# Patient Record
Sex: FEMALE | Race: WHITE | Employment: OTHER | RURAL
[De-identification: names, ages, dates, MRNs, and addresses within clinical notes are randomized per-mention and may not be internally consistent; named-entity substitution may affect disease eponyms.]

---

## 2024-07-12 ENCOUNTER — LAB (OUTPATIENT)
Age: 74
End: 2024-07-12
Payer: MEDICARE

## 2024-07-12 ENCOUNTER — OFFICE VISIT (OUTPATIENT)
Age: 74
End: 2024-07-12
Payer: MEDICARE

## 2024-07-12 VITALS
DIASTOLIC BLOOD PRESSURE: 70 MMHG | HEIGHT: 62 IN | BODY MASS INDEX: 34.82 KG/M2 | TEMPERATURE: 97.7 F | WEIGHT: 189.2 LBS | SYSTOLIC BLOOD PRESSURE: 138 MMHG | OXYGEN SATURATION: 94 % | RESPIRATION RATE: 16 BRPM | HEART RATE: 67 BPM

## 2024-07-12 DIAGNOSIS — Z11.59 ENCOUNTER FOR HCV SCREENING TEST FOR LOW RISK PATIENT: ICD-10-CM

## 2024-07-12 DIAGNOSIS — E78.5 HYPERLIPIDEMIA, UNSPECIFIED HYPERLIPIDEMIA TYPE: ICD-10-CM

## 2024-07-12 DIAGNOSIS — Z12.11 SCREENING FOR MALIGNANT NEOPLASM OF COLON: ICD-10-CM

## 2024-07-12 DIAGNOSIS — R73.02 IMPAIRED GLUCOSE TOLERANCE: ICD-10-CM

## 2024-07-12 DIAGNOSIS — E03.9 ACQUIRED HYPOTHYROIDISM: ICD-10-CM

## 2024-07-12 DIAGNOSIS — M81.0 AGE-RELATED OSTEOPOROSIS WITHOUT CURRENT PATHOLOGICAL FRACTURE: ICD-10-CM

## 2024-07-12 DIAGNOSIS — I10 ESSENTIAL HYPERTENSION: Primary | ICD-10-CM

## 2024-07-12 DIAGNOSIS — Z53.20 MAMMOGRAM DECLINED: ICD-10-CM

## 2024-07-12 DIAGNOSIS — I10 ESSENTIAL HYPERTENSION: ICD-10-CM

## 2024-07-12 PROCEDURE — 1123F ACP DISCUSS/DSCN MKR DOCD: CPT | Performed by: FAMILY MEDICINE

## 2024-07-12 PROCEDURE — 3075F SYST BP GE 130 - 139MM HG: CPT | Performed by: FAMILY MEDICINE

## 2024-07-12 PROCEDURE — 99203 OFFICE O/P NEW LOW 30 MIN: CPT | Performed by: FAMILY MEDICINE

## 2024-07-12 PROCEDURE — 3078F DIAST BP <80 MM HG: CPT | Performed by: FAMILY MEDICINE

## 2024-07-12 RX ORDER — OXYCODONE HYDROCHLORIDE 5 MG/1
5 TABLET ORAL
COMMUNITY
Start: 2024-06-17

## 2024-07-12 RX ORDER — METOPROLOL SUCCINATE 100 MG/1
100 TABLET, EXTENDED RELEASE ORAL DAILY
Qty: 90 TABLET | Refills: 1 | Status: SHIPPED | OUTPATIENT
Start: 2024-07-12

## 2024-07-12 RX ORDER — LISINOPRIL 40 MG/1
40 TABLET ORAL
COMMUNITY
Start: 2024-06-06 | End: 2024-07-12 | Stop reason: SDUPTHER

## 2024-07-12 RX ORDER — ASPIRIN 81 MG/1
81 TABLET ORAL
COMMUNITY
Start: 2024-06-17

## 2024-07-12 RX ORDER — AMLODIPINE BESYLATE 10 MG/1
10 TABLET ORAL DAILY
Qty: 90 TABLET | Refills: 1 | Status: SHIPPED | OUTPATIENT
Start: 2024-07-12

## 2024-07-12 RX ORDER — ACETAMINOPHEN 500 MG
1000 TABLET ORAL
COMMUNITY
Start: 2024-06-06

## 2024-07-12 RX ORDER — LISINOPRIL 40 MG/1
40 TABLET ORAL DAILY
Qty: 90 TABLET | Refills: 1 | Status: SHIPPED | OUTPATIENT
Start: 2024-07-12

## 2024-07-12 RX ORDER — METOPROLOL SUCCINATE 100 MG/1
100 TABLET, EXTENDED RELEASE ORAL
COMMUNITY
Start: 2024-03-11 | End: 2024-07-12

## 2024-07-12 RX ORDER — LEVOTHYROXINE SODIUM 0.05 MG/1
TABLET ORAL
COMMUNITY
Start: 2024-01-14 | End: 2024-07-12 | Stop reason: SDUPTHER

## 2024-07-12 RX ORDER — LEVOTHYROXINE SODIUM 0.05 MG/1
50 TABLET ORAL DAILY
Qty: 90 TABLET | Refills: 0 | Status: SHIPPED | OUTPATIENT
Start: 2024-07-12

## 2024-07-12 RX ORDER — ATORVASTATIN CALCIUM 20 MG/1
20 TABLET, FILM COATED ORAL
COMMUNITY
Start: 2024-01-15 | End: 2024-07-12 | Stop reason: SDUPTHER

## 2024-07-12 RX ORDER — AMLODIPINE BESYLATE 10 MG/1
10 TABLET ORAL
COMMUNITY
Start: 2024-01-15 | End: 2024-07-12 | Stop reason: SDUPTHER

## 2024-07-12 RX ORDER — ATORVASTATIN CALCIUM 20 MG/1
20 TABLET, FILM COATED ORAL DAILY
Qty: 90 TABLET | Refills: 1 | Status: SHIPPED | OUTPATIENT
Start: 2024-07-12

## 2024-07-12 SDOH — ECONOMIC STABILITY: FOOD INSECURITY: WITHIN THE PAST 12 MONTHS, THE FOOD YOU BOUGHT JUST DIDN'T LAST AND YOU DIDN'T HAVE MONEY TO GET MORE.: NEVER TRUE

## 2024-07-12 SDOH — ECONOMIC STABILITY: INCOME INSECURITY: HOW HARD IS IT FOR YOU TO PAY FOR THE VERY BASICS LIKE FOOD, HOUSING, MEDICAL CARE, AND HEATING?: NOT HARD AT ALL

## 2024-07-12 SDOH — ECONOMIC STABILITY: FOOD INSECURITY: WITHIN THE PAST 12 MONTHS, YOU WORRIED THAT YOUR FOOD WOULD RUN OUT BEFORE YOU GOT MONEY TO BUY MORE.: NEVER TRUE

## 2024-07-12 SDOH — ECONOMIC STABILITY: HOUSING INSECURITY
IN THE LAST 12 MONTHS, WAS THERE A TIME WHEN YOU DID NOT HAVE A STEADY PLACE TO SLEEP OR SLEPT IN A SHELTER (INCLUDING NOW)?: NO

## 2024-07-12 ASSESSMENT — PATIENT HEALTH QUESTIONNAIRE - PHQ9
2. FEELING DOWN, DEPRESSED OR HOPELESS: NOT AT ALL
SUM OF ALL RESPONSES TO PHQ QUESTIONS 1-9: 0
1. LITTLE INTEREST OR PLEASURE IN DOING THINGS: NOT AT ALL
SUM OF ALL RESPONSES TO PHQ QUESTIONS 1-9: 0
SUM OF ALL RESPONSES TO PHQ9 QUESTIONS 1 & 2: 0

## 2024-07-12 ASSESSMENT — LIFESTYLE VARIABLES
HOW MANY STANDARD DRINKS CONTAINING ALCOHOL DO YOU HAVE ON A TYPICAL DAY: PATIENT DOES NOT DRINK
HOW OFTEN DO YOU HAVE A DRINK CONTAINING ALCOHOL: NEVER

## 2024-07-12 NOTE — PROGRESS NOTES
[place] [address] [place] [address]  Phone: [phone]  Fax: [phone]        Chief Complaint   Patient presents with    New Patient    Establish Care    Medicare AWV    Medication Refill     She is a 74 y.o. female who presents for establish care.  New to [facility].  Was living in [place], [place].    Presents for HTN follow up.  Taking medications as prescribed and reports no side effects.  Denies chest pain, headache, visual disturbances.  Says Bps at home have been up to 160s/90s, but have been better as of late.    Reports good compliance with thyroid medications. No issues/side effects.  Denies signs/symptoms of hyper/hypothyroidism.    Underwent right total hip on 6/17/24.  She is following with orthopedics and PT for this.    Reports that she never does mammograms.  Does not have any family history nor does she see the need for this.    Prior to Visit Medications    Medication Sig Taking? Authorizing Provider   acetaminophen (TYLENOL) 500 MG tablet Take 2 tablets by mouth Yes Sixto Zeng MD   ascorbic acid (VITAMIN C) 1000 MG tablet Take 1 tablet by mouth Yes Sixto Zeng MD   aspirin 81 MG EC tablet Take 1 tablet by mouth Yes Sixto Zeng MD   vitamin D3 (CHOLECALCIFEROL) 125 MCG (5000 UT) TABS tablet Take 1 tablet by mouth Yes Sixto Zeng MD   oxyCODONE (ROXICODONE) 5 MG immediate release tablet Take 1 tablet by mouth. Yes Sixto Zeng MD   amLODIPine (NORVASC) 10 MG tablet Take 1 tablet by mouth daily Yes Chepe Larose MD   atorvastatin (LIPITOR) 20 MG tablet Take 1 tablet by mouth daily Yes Chepe Larose MD   levothyroxine (SYNTHROID) 50 MCG tablet Take 1 tablet by mouth Daily Yes Chepe Larose MD   lisinopril (PRINIVIL;ZESTRIL) 40 MG tablet Take 1 tablet by mouth daily Yes Chepe Larose MD   metoprolol succinate (TOPROL XL) 100 MG extended release tablet Take 1 tablet by mouth daily Yes Chepe Larose MD       Allergies

## 2024-07-12 NOTE — PROGRESS NOTES
Identified pt with two pt identifiers(name and ).    Chief Complaint   Patient presents with    New Patient    Establish Care    Medicare AWV    Medication Refill        Health Maintenance Due   Topic    COVID-19 Vaccine (1)    Lipids     Depression Screen     Hepatitis C screen     DTaP/Tdap/Td vaccine (1 - Tdap)    Breast cancer screen     Colorectal Cancer Screen     Shingles vaccine (1 of 2)    DEXA (modify frequency per FRAX score)     Respiratory Syncytial Virus (RSV) Pregnant or age 60 yrs+ (1 - 1-dose 60+ series)    Pneumococcal 65+ years Vaccine (1 of 1 - PCV)    Annual Wellness Visit (Medicare)        Wt Readings from Last 3 Encounters:   24 85.8 kg (189 lb 3.2 oz)     Temp Readings from Last 3 Encounters:   24 97.7 °F (36.5 °C) (Temporal)     BP Readings from Last 3 Encounters:   24 138/70     Pulse Readings from Last 3 Encounters:   24 67           Depression Screening:  :         2024     1:11 PM   PHQ-9 Questionaire   Little interest or pleasure in doing things 0   Feeling down, depressed, or hopeless 0   PHQ-9 Total Score 0        Fall Risk Assessment:  :         2024     1:10 PM   Fall Risk   2 or more falls in past year? no   Fall with injury in past year? no        Abuse Screening:  :          No data to display                 Coordination of Care Questionnaire:  :     \"Have you been to the ER, urgent care clinic since your last visit?  Hospitalized since your last visit?\"    NO    “Have you seen or consulted any other health care providers outside of [facility] since your last visit?”    NO    Have you had a mammogram?”   NO  Pt states she doesn't do mammogram   No breast cancer screening on file         “Have you had a colorectal cancer screening such as a colonoscopy/FIT/Cologuard?    NO    No colonoscopy on file  No cologuard on file  No FIT/FOBT on file   No flexible sigmoidoscopy on file         Click Here for Release of Records Request

## 2024-07-13 LAB
ALBUMIN SERPL-MCNC: 4 G/DL (ref 3.5–5)
ALBUMIN/GLOB SERPL: 1.5 (ref 1.1–2.2)
ALP SERPL-CCNC: 162 U/L (ref 45–117)
ALT SERPL-CCNC: 22 U/L (ref 12–78)
ANION GAP SERPL CALC-SCNC: 4 MMOL/L (ref 5–15)
AST SERPL-CCNC: 13 U/L (ref 15–37)
BASOPHILS # BLD: 0 K/UL (ref 0–0.1)
BASOPHILS NFR BLD: 0 % (ref 0–1)
BILIRUB SERPL-MCNC: 0.4 MG/DL (ref 0.2–1)
BUN SERPL-MCNC: 15 MG/DL (ref 6–20)
BUN/CREAT SERPL: 14 (ref 12–20)
CALCIUM SERPL-MCNC: 9.3 MG/DL (ref 8.5–10.1)
CHLORIDE SERPL-SCNC: 103 MMOL/L (ref 97–108)
CHOLEST SERPL-MCNC: 151 MG/DL
CO2 SERPL-SCNC: 28 MMOL/L (ref 21–32)
CREAT SERPL-MCNC: 1.06 MG/DL (ref 0.55–1.02)
DIFFERENTIAL METHOD BLD: ABNORMAL
EOSINOPHIL # BLD: 0.1 K/UL (ref 0–0.4)
EOSINOPHIL NFR BLD: 1 % (ref 0–7)
ERYTHROCYTE [DISTWIDTH] IN BLOOD BY AUTOMATED COUNT: 13.1 % (ref 11.5–14.5)
EST. AVERAGE GLUCOSE BLD GHB EST-MCNC: 108 MG/DL
GLOBULIN SER CALC-MCNC: 2.7 G/DL (ref 2–4)
GLUCOSE SERPL-MCNC: 119 MG/DL (ref 65–100)
HBA1C MFR BLD: 5.4 % (ref 4–5.6)
HCT VFR BLD AUTO: 39.6 % (ref 35–47)
HCV AB SER IA-ACNC: 0.1 INDEX
HCV AB SERPL QL IA: NONREACTIVE
HDLC SERPL-MCNC: 59 MG/DL
HDLC SERPL: 2.6 (ref 0–5)
HGB BLD-MCNC: 12.3 G/DL (ref 11.5–16)
IMM GRANULOCYTES # BLD AUTO: 0 K/UL (ref 0–0.04)
IMM GRANULOCYTES NFR BLD AUTO: 0 % (ref 0–0.5)
LDLC SERPL CALC-MCNC: 54.2 MG/DL (ref 0–100)
LDLC SERPL DIRECT ASSAY-MCNC: 68 MG/DL (ref 0–100)
LYMPHOCYTES # BLD: 2.1 K/UL (ref 0.8–3.5)
LYMPHOCYTES NFR BLD: 30 % (ref 12–49)
MCH RBC QN AUTO: 31.5 PG (ref 26–34)
MCHC RBC AUTO-ENTMCNC: 31.1 G/DL (ref 30–36.5)
MCV RBC AUTO: 101.3 FL (ref 80–99)
MONOCYTES # BLD: 0.7 K/UL (ref 0–1)
MONOCYTES NFR BLD: 10 % (ref 5–13)
NEUTS SEG # BLD: 4.2 K/UL (ref 1.8–8)
NEUTS SEG NFR BLD: 59 % (ref 32–75)
NRBC # BLD: 0 K/UL (ref 0–0.01)
NRBC BLD-RTO: 0 PER 100 WBC
PLATELET # BLD AUTO: 411 K/UL (ref 150–400)
PMV BLD AUTO: 10.8 FL (ref 8.9–12.9)
POTASSIUM SERPL-SCNC: 4.5 MMOL/L (ref 3.5–5.1)
PROT SERPL-MCNC: 6.7 G/DL (ref 6.4–8.2)
RBC # BLD AUTO: 3.91 M/UL (ref 3.8–5.2)
SODIUM SERPL-SCNC: 135 MMOL/L (ref 136–145)
TRIGL SERPL-MCNC: 189 MG/DL
TSH SERPL DL<=0.05 MIU/L-ACNC: 1.06 UIU/ML (ref 0.36–3.74)
VLDLC SERPL CALC-MCNC: 37.8 MG/DL
WBC # BLD AUTO: 7.1 K/UL (ref 3.6–11)

## 2024-07-23 ENCOUNTER — HOSPITAL ENCOUNTER (OUTPATIENT)
Facility: HOSPITAL | Age: 74
Discharge: HOME | End: 2024-07-26
Attending: FAMILY MEDICINE
Payer: MEDICARE

## 2024-07-23 VITALS — BODY MASS INDEX: 34.78 KG/M2 | HEIGHT: 62 IN | WEIGHT: 189 LBS

## 2024-07-23 DIAGNOSIS — M81.0 AGE-RELATED OSTEOPOROSIS WITHOUT CURRENT PATHOLOGICAL FRACTURE: ICD-10-CM

## 2024-07-23 PROCEDURE — 77080 DXA BONE DENSITY AXIAL: CPT

## 2024-08-02 ENCOUNTER — TELEMEDICINE (OUTPATIENT)
Age: 74
End: 2024-08-02
Payer: MEDICARE

## 2024-08-02 DIAGNOSIS — M81.0 AGE-RELATED OSTEOPOROSIS WITHOUT CURRENT PATHOLOGICAL FRACTURE: Primary | ICD-10-CM

## 2024-08-02 PROCEDURE — 1123F ACP DISCUSS/DSCN MKR DOCD: CPT | Performed by: FAMILY MEDICINE

## 2024-08-02 PROCEDURE — 99213 OFFICE O/P EST LOW 20 MIN: CPT | Performed by: FAMILY MEDICINE

## 2024-08-02 RX ORDER — ALENDRONATE SODIUM 70 MG/1
70 TABLET ORAL
Qty: 13 TABLET | Refills: 1 | Status: SHIPPED | OUTPATIENT
Start: 2024-08-02

## 2024-08-02 NOTE — PROGRESS NOTES
[facility]  [address] [place] [address]  Phone: [phone]  Fax: [phone]      Brittney Alston, was evaluated through a synchronous (real-time) audio-video encounter. The patient (or guardian if applicable) is aware that this is a billable service, which includes applicable co-pays. This Virtual Visit was conducted with patient's (and/or legal guardian's) consent. Patient identification was verified, and a caregiver was present when appropriate.   The patient was located at Home: [address] [place] [id number]  Provider was located at Facility (Appt Dept): [facility] [place] [address]  Confirm you are appropriately licensed, registered, or certified to deliver care in the state where the patient is located as indicated above. If you are not or unsure, please re-schedule the visit: Yes, I confirm.       Chief Complaint   Patient presents with    Follow-up     Bone density      She is a 74 y.o. female who presents for DEXA follow up. Had DEXA on 7/23 which showed osteoporosis.   Never been told she has this before. No fractures. She has been taking her vitamin D supplement.     Underwent right total hip replacement in 6/2024. She is in therapy for this and reports that this is going well.    Reviewed PmHx, RxHx, FmHx, SocHx, AllgHx and updated and dated in the chart.      Objective:   There were no vitals filed for this visit.  Physical Examination:    Physical Exam  Nursing note reviewed.   Constitutional:       General: She is not in acute distress.     Appearance: Normal appearance.   HENT:      Head: Normocephalic.   Pulmonary:      Effort: Pulmonary effort is normal. No respiratory distress.   Musculoskeletal:      Cervical back: Normal range of motion.   Neurological:      Mental Status: She is alert and oriented to person, place, and time.   Psychiatric:         Mood and Affect: Mood normal.         Behavior: Behavior normal.

## 2024-08-02 NOTE — PROGRESS NOTES
Identified pt with two pt identifiers(name and ).    Chief Complaint   Patient presents with    Follow-up     Bone density         Health Maintenance Due   Topic    COVID-19 Vaccine (1)    DTaP/Tdap/Td vaccine (1 - Tdap)    Breast cancer screen     Colorectal Cancer Screen     Shingles vaccine (1 of 2)    Respiratory Syncytial Virus (RSV) Pregnant or age 60 yrs+ (1 - 1-dose 60+ series)    Pneumococcal 65+ years Vaccine (1 of 1 - PCV)    Annual Wellness Visit (Medicare Advantage)     Flu vaccine (1)       Wt Readings from Last 3 Encounters:   24 85.7 kg (189 lb)   24 85.8 kg (189 lb 3.2 oz)     Temp Readings from Last 3 Encounters:   24 97.7 °F (36.5 °C) (Temporal)     BP Readings from Last 3 Encounters:   24 138/70     Pulse Readings from Last 3 Encounters:   24 67           Depression Screening:  :         2024     1:25 PM 2024     1:11 PM   PHQ-9 Questionaire   Little interest or pleasure in doing things 0 0   Feeling down, depressed, or hopeless 0 0   PHQ-9 Total Score 0 0        Fall Risk Assessment:  :         2024     1:10 PM   Fall Risk   Do you feel unsteady or are you worried about falling?  yes   2 or more falls in past year? no   Fall with injury in past year? no        Abuse Screening:  :          No data to display                 Coordination of Care Questionnaire:  :     \"Have you been to the ER, urgent care clinic since your last visit?  Hospitalized since your last visit?\"    NO    “Have you seen or consulted any other health care providers outside of [facility] since your last visit?”    NO    Have you had a mammogram?”   NO    No breast cancer screening on file         “Have you had a colorectal cancer screening such as a colonoscopy/FIT/Cologuard?    NO    No colonoscopy on file  No cologuard on file  No FIT/FOBT on file   No flexible sigmoidoscopy on file         Click Here for Release of Records Request

## 2024-08-07 LAB — HEMOCCULT STL QL IA: NEGATIVE

## 2024-11-03 DIAGNOSIS — I10 ESSENTIAL HYPERTENSION: ICD-10-CM

## 2024-11-04 RX ORDER — LISINOPRIL 40 MG/1
40 TABLET ORAL DAILY
Qty: 90 TABLET | Refills: 1 | Status: SHIPPED | OUTPATIENT
Start: 2024-11-04

## 2024-11-05 ENCOUNTER — HOSPITAL ENCOUNTER (INPATIENT)
Facility: HOSPITAL | Age: 74
LOS: 4 days | Discharge: HOME | End: 2024-11-09
Attending: STUDENT IN AN ORGANIZED HEALTH CARE EDUCATION/TRAINING PROGRAM | Admitting: FAMILY MEDICINE
Payer: MEDICARE

## 2024-11-05 ENCOUNTER — APPOINTMENT (OUTPATIENT)
Facility: HOSPITAL | Age: 74
End: 2024-11-05
Payer: MEDICARE

## 2024-11-05 DIAGNOSIS — J96.02 ACUTE RESPIRATORY FAILURE WITH HYPOXIA AND HYPERCARBIA: Primary | ICD-10-CM

## 2024-11-05 DIAGNOSIS — E87.1 HYPONATREMIA: ICD-10-CM

## 2024-11-05 DIAGNOSIS — R94.31 ABNORMAL ELECTROCARDIOGRAPHY: ICD-10-CM

## 2024-11-05 DIAGNOSIS — J96.01 ACUTE RESPIRATORY FAILURE WITH HYPOXIA AND HYPERCARBIA: Primary | ICD-10-CM

## 2024-11-05 LAB
ALBUMIN SERPL-MCNC: 3.9 G/DL (ref 3.5–5)
ALBUMIN/GLOB SERPL: 0.9 (ref 1.1–2.2)
ALP SERPL-CCNC: 112 U/L (ref 45–117)
ALT SERPL-CCNC: ABNORMAL U/L (ref 12–78)
ANION GAP SERPL CALC-SCNC: 1 MMOL/L (ref 2–12)
ANION GAP SERPL CALC-SCNC: 3 MMOL/L (ref 2–12)
ARTERIAL PATENCY WRIST A: ABNORMAL
ARTERIAL PATENCY WRIST A: ABNORMAL
ARTERIAL PATENCY WRIST A: YES
AST SERPL-CCNC: ABNORMAL U/L (ref 15–37)
BASE EXCESS BLDA CALC-SCNC: 3.3 MMOL/L
BASE EXCESS BLDA CALC-SCNC: 4.6 MMOL/L
BASE EXCESS BLDA CALC-SCNC: 5.5 MMOL/L
BASOPHILS # BLD: 0 K/UL (ref 0–0.1)
BASOPHILS NFR BLD: 0 % (ref 0–1)
BDY SITE: ABNORMAL
BILIRUB SERPL-MCNC: 0.7 MG/DL (ref 0.2–1)
BUN SERPL-MCNC: 10 MG/DL (ref 6–20)
BUN SERPL-MCNC: 11 MG/DL (ref 6–20)
BUN/CREAT SERPL: 16 (ref 12–20)
BUN/CREAT SERPL: 16 (ref 12–20)
CALCIUM SERPL-MCNC: 8.7 MG/DL (ref 8.5–10.1)
CALCIUM SERPL-MCNC: 9.3 MG/DL (ref 8.5–10.1)
CHLORIDE SERPL-SCNC: 82 MMOL/L (ref 97–108)
CHLORIDE SERPL-SCNC: 85 MMOL/L (ref 97–108)
CO2 SERPL-SCNC: 36 MMOL/L (ref 21–32)
CO2 SERPL-SCNC: 36 MMOL/L (ref 21–32)
CREAT SERPL-MCNC: 0.61 MG/DL (ref 0.55–1.02)
CREAT SERPL-MCNC: 0.7 MG/DL (ref 0.55–1.02)
DIFFERENTIAL METHOD BLD: ABNORMAL
EOSINOPHIL # BLD: 0.1 K/UL (ref 0–0.4)
EOSINOPHIL NFR BLD: 1 % (ref 0–7)
ERYTHROCYTE [DISTWIDTH] IN BLOOD BY AUTOMATED COUNT: 13.2 % (ref 11.5–14.5)
FIO2 ON VENT: 40 %
FIO2 ON VENT: 40 %
FLUAV RNA SPEC QL NAA+PROBE: NOT DETECTED
FLUBV RNA SPEC QL NAA+PROBE: NOT DETECTED
GAS FLOW.O2 O2 DELIVERY SYS: 4 L/MIN
GAS FLOW.O2 SETTING OXYMISER: 26
GAS FLOW.O2 SETTING OXYMISER: 4
GLOBULIN SER CALC-MCNC: 4.4 G/DL (ref 2–4)
GLUCOSE SERPL-MCNC: 110 MG/DL (ref 65–100)
GLUCOSE SERPL-MCNC: 115 MG/DL (ref 65–100)
HCO3 BLDA-SCNC: 32 MMOL/L (ref 22–26)
HCO3 BLDA-SCNC: 36 MMOL/L (ref 22–26)
HCO3 BLDA-SCNC: 37 MMOL/L (ref 22–26)
HCT VFR BLD AUTO: 47.6 % (ref 35–47)
HGB BLD-MCNC: 15.6 G/DL (ref 11.5–16)
IMM GRANULOCYTES # BLD AUTO: 0.1 K/UL (ref 0–0.04)
IMM GRANULOCYTES NFR BLD AUTO: 1 % (ref 0–0.5)
INR PPP: 1 (ref 0.9–1.1)
IPAP/PIP: 14
IPAP/PIP: 28
LYMPHOCYTES # BLD: 2.4 K/UL (ref 0.8–3.5)
LYMPHOCYTES NFR BLD: 18 % (ref 12–49)
MAGNESIUM SERPL-MCNC: 2 MG/DL (ref 1.6–2.4)
MAGNESIUM SERPL-MCNC: NORMAL MG/DL (ref 1.6–2.4)
MCH RBC QN AUTO: 30.6 PG (ref 26–34)
MCHC RBC AUTO-ENTMCNC: 32.8 G/DL (ref 30–36.5)
MCV RBC AUTO: 93.5 FL (ref 80–99)
MONOCYTES # BLD: 1 K/UL (ref 0–1)
MONOCYTES NFR BLD: 7 % (ref 5–13)
NEUTS SEG # BLD: 9.9 K/UL (ref 1.8–8)
NEUTS SEG NFR BLD: 73 % (ref 32–75)
NRBC # BLD: 0 K/UL (ref 0–0.01)
NRBC BLD-RTO: 0 PER 100 WBC
NT PRO BNP: 494 PG/ML
PCO2 BLDA: 70 MMHG (ref 35–45)
PCO2 BLDA: 87 MMHG (ref 35–45)
PCO2 BLDA: 88 MMHG (ref 35–45)
PEEP RESPIRATORY: 6
PEEP RESPIRATORY: 6
PH BLDA: 7.23 (ref 7.35–7.45)
PH BLDA: 7.24 (ref 7.35–7.45)
PH BLDA: 7.28 (ref 7.35–7.45)
PLATELET # BLD AUTO: 327 K/UL (ref 150–400)
PMV BLD AUTO: 10.7 FL (ref 8.9–12.9)
PO2 BLDA: 76 MMHG (ref 80–100)
PO2 BLDA: 77 MMHG (ref 80–100)
PO2 BLDA: 99 MMHG (ref 80–100)
POTASSIUM SERPL-SCNC: 4.3 MMOL/L (ref 3.5–5.1)
POTASSIUM SERPL-SCNC: ABNORMAL MMOL/L (ref 3.5–5.1)
PROT SERPL-MCNC: 8.3 G/DL (ref 6.4–8.2)
PROTHROMBIN TIME: 10.7 SEC (ref 9–11.1)
RBC # BLD AUTO: 5.09 M/UL (ref 3.8–5.2)
SAO2 % BLD: 92 % (ref 92–97)
SAO2 % BLD: 92 % (ref 92–97)
SAO2 % BLD: 97 % (ref 92–97)
SAO2% DEVICE SAO2% SENSOR NAME: ABNORMAL
SARS-COV-2 RNA RESP QL NAA+PROBE: NOT DETECTED
SERVICE CMNT-IMP: ABNORMAL
SERVICE CMNT-IMP: ABNORMAL
SODIUM SERPL-SCNC: 119 MMOL/L (ref 136–145)
SODIUM SERPL-SCNC: 124 MMOL/L (ref 136–145)
SOURCE: NORMAL
SPECIMEN SITE: ABNORMAL
TROPONIN I SERPL HS-MCNC: 11 NG/L (ref 0–51)
TROPONIN I SERPL HS-MCNC: 9 NG/L (ref 0–51)
TSH SERPL DL<=0.05 MIU/L-ACNC: 1.82 UIU/ML (ref 0.36–3.74)
WBC # BLD AUTO: 13.5 K/UL (ref 3.6–11)

## 2024-11-05 PROCEDURE — 6360000004 HC RX CONTRAST MEDICATION: Performed by: STUDENT IN AN ORGANIZED HEALTH CARE EDUCATION/TRAINING PROGRAM

## 2024-11-05 PROCEDURE — 80053 COMPREHEN METABOLIC PANEL: CPT

## 2024-11-05 PROCEDURE — 71045 X-RAY EXAM CHEST 1 VIEW: CPT

## 2024-11-05 PROCEDURE — 83735 ASSAY OF MAGNESIUM: CPT

## 2024-11-05 PROCEDURE — 6360000002 HC RX W HCPCS: Performed by: STUDENT IN AN ORGANIZED HEALTH CARE EDUCATION/TRAINING PROGRAM

## 2024-11-05 PROCEDURE — 99291 CRITICAL CARE FIRST HOUR: CPT

## 2024-11-05 PROCEDURE — 85610 PROTHROMBIN TIME: CPT

## 2024-11-05 PROCEDURE — 71275 CT ANGIOGRAPHY CHEST: CPT

## 2024-11-05 PROCEDURE — 94761 N-INVAS EAR/PLS OXIMETRY MLT: CPT

## 2024-11-05 PROCEDURE — 94640 AIRWAY INHALATION TREATMENT: CPT

## 2024-11-05 PROCEDURE — 87636 SARSCOV2 & INF A&B AMP PRB: CPT

## 2024-11-05 PROCEDURE — 6360000002 HC RX W HCPCS

## 2024-11-05 PROCEDURE — 2580000003 HC RX 258: Performed by: STUDENT IN AN ORGANIZED HEALTH CARE EDUCATION/TRAINING PROGRAM

## 2024-11-05 PROCEDURE — 84443 ASSAY THYROID STIM HORMONE: CPT

## 2024-11-05 PROCEDURE — 82803 BLOOD GASES ANY COMBINATION: CPT

## 2024-11-05 PROCEDURE — 36415 COLL VENOUS BLD VENIPUNCTURE: CPT

## 2024-11-05 PROCEDURE — 96374 THER/PROPH/DIAG INJ IV PUSH: CPT

## 2024-11-05 PROCEDURE — 84484 ASSAY OF TROPONIN QUANT: CPT

## 2024-11-05 PROCEDURE — 94660 CPAP INITIATION&MGMT: CPT

## 2024-11-05 PROCEDURE — 1100000000 HC RM PRIVATE

## 2024-11-05 PROCEDURE — 93005 ELECTROCARDIOGRAM TRACING: CPT | Performed by: STUDENT IN AN ORGANIZED HEALTH CARE EDUCATION/TRAINING PROGRAM

## 2024-11-05 PROCEDURE — 85025 COMPLETE CBC W/AUTO DIFF WBC: CPT

## 2024-11-05 PROCEDURE — 2700000000 HC OXYGEN THERAPY PER DAY

## 2024-11-05 PROCEDURE — 5A09357 ASSISTANCE WITH RESPIRATORY VENTILATION, LESS THAN 24 CONSECUTIVE HOURS, CONTINUOUS POSITIVE AIRWAY PRESSURE: ICD-10-PCS | Performed by: FAMILY MEDICINE

## 2024-11-05 PROCEDURE — 83880 ASSAY OF NATRIURETIC PEPTIDE: CPT

## 2024-11-05 PROCEDURE — 6370000000 HC RX 637 (ALT 250 FOR IP): Performed by: STUDENT IN AN ORGANIZED HEALTH CARE EDUCATION/TRAINING PROGRAM

## 2024-11-05 PROCEDURE — 36600 WITHDRAWAL OF ARTERIAL BLOOD: CPT

## 2024-11-05 RX ORDER — POLYETHYLENE GLYCOL 3350 17 G/17G
17 POWDER, FOR SOLUTION ORAL DAILY PRN
Status: DISCONTINUED | OUTPATIENT
Start: 2024-11-05 | End: 2024-11-09 | Stop reason: HOSPADM

## 2024-11-05 RX ORDER — LISINOPRIL 20 MG/1
40 TABLET ORAL DAILY
Status: DISCONTINUED | OUTPATIENT
Start: 2024-11-06 | End: 2024-11-09 | Stop reason: HOSPADM

## 2024-11-05 RX ORDER — ACETAMINOPHEN 650 MG/1
650 SUPPOSITORY RECTAL EVERY 6 HOURS PRN
Status: DISCONTINUED | OUTPATIENT
Start: 2024-11-05 | End: 2024-11-09 | Stop reason: HOSPADM

## 2024-11-05 RX ORDER — LEVOTHYROXINE SODIUM 50 UG/1
50 TABLET ORAL DAILY
Status: DISCONTINUED | OUTPATIENT
Start: 2024-11-06 | End: 2024-11-09 | Stop reason: HOSPADM

## 2024-11-05 RX ORDER — ASPIRIN 81 MG/1
81 TABLET ORAL ONCE
Status: CANCELLED | OUTPATIENT
Start: 2024-11-05

## 2024-11-05 RX ORDER — IPRATROPIUM BROMIDE AND ALBUTEROL SULFATE 2.5; .5 MG/3ML; MG/3ML
1 SOLUTION RESPIRATORY (INHALATION)
Status: COMPLETED | OUTPATIENT
Start: 2024-11-05 | End: 2024-11-05

## 2024-11-05 RX ORDER — OXYCODONE HYDROCHLORIDE 5 MG/1
5 TABLET ORAL EVERY 6 HOURS PRN
Status: DISCONTINUED | OUTPATIENT
Start: 2024-11-05 | End: 2024-11-09 | Stop reason: HOSPADM

## 2024-11-05 RX ORDER — ATORVASTATIN CALCIUM 20 MG/1
20 TABLET, FILM COATED ORAL NIGHTLY
Status: DISCONTINUED | OUTPATIENT
Start: 2024-11-05 | End: 2024-11-09 | Stop reason: HOSPADM

## 2024-11-05 RX ORDER — SODIUM CHLORIDE 9 MG/ML
INJECTION, SOLUTION INTRAVENOUS PRN
Status: DISCONTINUED | OUTPATIENT
Start: 2024-11-05 | End: 2024-11-09 | Stop reason: HOSPADM

## 2024-11-05 RX ORDER — ENOXAPARIN SODIUM 100 MG/ML
40 INJECTION SUBCUTANEOUS DAILY
Status: DISCONTINUED | OUTPATIENT
Start: 2024-11-05 | End: 2024-11-09 | Stop reason: HOSPADM

## 2024-11-05 RX ORDER — BUDESONIDE 0.5 MG/2ML
0.5 INHALANT ORAL
Status: DISCONTINUED | OUTPATIENT
Start: 2024-11-05 | End: 2024-11-09 | Stop reason: HOSPADM

## 2024-11-05 RX ORDER — SODIUM CHLORIDE 0.9 % (FLUSH) 0.9 %
5-40 SYRINGE (ML) INJECTION PRN
Status: DISCONTINUED | OUTPATIENT
Start: 2024-11-05 | End: 2024-11-09 | Stop reason: HOSPADM

## 2024-11-05 RX ORDER — SODIUM CHLORIDE 0.9 % (FLUSH) 0.9 %
5-40 SYRINGE (ML) INJECTION EVERY 12 HOURS SCHEDULED
Status: DISCONTINUED | OUTPATIENT
Start: 2024-11-05 | End: 2024-11-09 | Stop reason: HOSPADM

## 2024-11-05 RX ORDER — ASCORBIC ACID 500 MG
1000 TABLET ORAL DAILY
Status: DISCONTINUED | OUTPATIENT
Start: 2024-11-06 | End: 2024-11-09 | Stop reason: HOSPADM

## 2024-11-05 RX ORDER — METOPROLOL SUCCINATE 50 MG/1
100 TABLET, EXTENDED RELEASE ORAL DAILY
Status: DISCONTINUED | OUTPATIENT
Start: 2024-11-06 | End: 2024-11-09 | Stop reason: HOSPADM

## 2024-11-05 RX ORDER — FUROSEMIDE 10 MG/ML
20 INJECTION INTRAMUSCULAR; INTRAVENOUS 2 TIMES DAILY
Status: DISCONTINUED | OUTPATIENT
Start: 2024-11-05 | End: 2024-11-08

## 2024-11-05 RX ORDER — IOPAMIDOL 755 MG/ML
100 INJECTION, SOLUTION INTRAVASCULAR
Status: COMPLETED | OUTPATIENT
Start: 2024-11-05 | End: 2024-11-05

## 2024-11-05 RX ORDER — ARFORMOTEROL TARTRATE 15 UG/2ML
15 SOLUTION RESPIRATORY (INHALATION)
Status: DISCONTINUED | OUTPATIENT
Start: 2024-11-05 | End: 2024-11-09 | Stop reason: HOSPADM

## 2024-11-05 RX ORDER — AMLODIPINE BESYLATE 5 MG/1
10 TABLET ORAL DAILY
Status: DISCONTINUED | OUTPATIENT
Start: 2024-11-06 | End: 2024-11-09 | Stop reason: HOSPADM

## 2024-11-05 RX ORDER — ONDANSETRON 2 MG/ML
4 INJECTION INTRAMUSCULAR; INTRAVENOUS EVERY 6 HOURS PRN
Status: DISCONTINUED | OUTPATIENT
Start: 2024-11-05 | End: 2024-11-09 | Stop reason: HOSPADM

## 2024-11-05 RX ORDER — IPRATROPIUM BROMIDE AND ALBUTEROL SULFATE 2.5; .5 MG/3ML; MG/3ML
1 SOLUTION RESPIRATORY (INHALATION)
Status: DISCONTINUED | OUTPATIENT
Start: 2024-11-06 | End: 2024-11-07

## 2024-11-05 RX ORDER — ACETAMINOPHEN 325 MG/1
650 TABLET ORAL EVERY 6 HOURS PRN
Status: DISCONTINUED | OUTPATIENT
Start: 2024-11-05 | End: 2024-11-09 | Stop reason: HOSPADM

## 2024-11-05 RX ORDER — ONDANSETRON 4 MG/1
4 TABLET, ORALLY DISINTEGRATING ORAL EVERY 8 HOURS PRN
Status: DISCONTINUED | OUTPATIENT
Start: 2024-11-05 | End: 2024-11-09 | Stop reason: HOSPADM

## 2024-11-05 RX ADMIN — IOPAMIDOL 100 ML: 755 INJECTION, SOLUTION INTRAVENOUS at 17:21

## 2024-11-05 RX ADMIN — IPRATROPIUM BROMIDE AND ALBUTEROL SULFATE 1 DOSE: 2.5; .5 SOLUTION RESPIRATORY (INHALATION) at 14:47

## 2024-11-05 RX ADMIN — FUROSEMIDE 20 MG: 10 INJECTION, SOLUTION INTRAMUSCULAR; INTRAVENOUS at 19:41

## 2024-11-05 RX ADMIN — METHYLPREDNISOLONE SODIUM SUCCINATE 125 MG: 125 INJECTION, POWDER, LYOPHILIZED, FOR SOLUTION INTRAMUSCULAR; INTRAVENOUS at 14:43

## 2024-11-05 RX ADMIN — ENOXAPARIN SODIUM 40 MG: 100 INJECTION SUBCUTANEOUS at 19:41

## 2024-11-05 RX ADMIN — IPRATROPIUM BROMIDE AND ALBUTEROL SULFATE 1 DOSE: 2.5; .5 SOLUTION RESPIRATORY (INHALATION) at 14:46

## 2024-11-05 ASSESSMENT — PAIN - FUNCTIONAL ASSESSMENT: PAIN_FUNCTIONAL_ASSESSMENT: 0-10

## 2024-11-05 ASSESSMENT — PAIN SCALES - GENERAL: PAINLEVEL_OUTOF10: 0

## 2024-11-05 NOTE — ED NOTES
1620--Came into room--Pt had pulled off BiPaP, Ecg leads, pulse ox--Pt placed on oxygen via NRB 15 lpm--100 pulse ox. Pt placed back on Ecg, pulse ox.     Pt is A&ox4, GCS of 15 at this itme.     1638--Pt placed back on BiPAP by Resp.     1712--Spoke with RT--cleared PT to go to CT with NC at 6lpm.     1730--Pt back from CT--placed back on BiPaP.

## 2024-11-05 NOTE — H&P
Admission H&P     Name: Brittney Alston MRN: [id number]  Sex: Female   YOB: 1950  Age: 74 y.o.  PCP: Chepe Larose MD     Source of Information: patient, medical records    Chief complaint: SOB    History of Present Illness  Brittney Alston is a 74 y.o. female with PMH HTN, HLD, hypothyroidism, osteoporosis  who presents to the ER with about one week of SOB and nausea. Has also had increasing leg swelling over 2 months. Denies CP, vomiting, changes to bowel habits, fever/chills. Denies h/o lung or heart disease.  also present and mentions she seemed confused over past week or so.      In the ER:      Vitals: afebrile, HR 71, /127 OA, O2 64% on RA    Labs: pH 7.24, pco2 88, po2 77, wbc 13.5, trop 11>9, , Na 119, covid and flu neg    Imaging: CXR low lung vol, mild peribronchial cuffing. CTA chest w/wo neg for PE, shows mosaic attenuation and scattered mucus plugging and bronchial wall thickening s/o small airway disease    EKG: LBBB (no priors), rate 72    Treatment: duoneb x3, IV solumedrol    Review of Systems  Review of Systems    Home Medications   Prior to Admission medications    Medication Sig Start Date End Date Taking? Authorizing Provider   lisinopril (PRINIVIL;ZESTRIL) 40 MG tablet TAKE ONE TABLET BY MOUTH ONE TIME DAILY 11/4/24   Chepe Larose MD   alendronate (FOSAMAX) 70 MG tablet Take 1 tablet by mouth every 7 days 8/2/24   Chepe Larose MD   acetaminophen (TYLENOL) 500 MG tablet Take 2 tablets by mouth 6/6/24   Sixto Zeng MD   ascorbic acid (VITAMIN C) 1000 MG tablet Take 1 tablet by mouth 6/6/24   Sixto Zeng MD   aspirin 81 MG EC tablet Take 1 tablet by mouth 6/17/24   Sixto Zeng MD   vitamin D3 (CHOLECALCIFEROL) 125 MCG (5000 UT) TABS tablet Take 1 tablet by mouth 6/6/24   Sixto Zeng MD   oxyCODONE (ROXICODONE) 5 MG immediate release tablet Take 1 tablet by mouth. 6/17/24   Sixto Zeng MD   amLODIPine (NORVASC) 10

## 2024-11-05 NOTE — ED TRIAGE NOTES
Pt arrives to the ER for complaints of nausea, shortness of breath and slurred speech. Pt reports that the slurred speech started today however, pt appears to short of breath with completing sentences.     Pt feels that she was unable to take a deep breath. Reports that she has improvement with the oxygen.     Pt room air in triage was 54% and placed on NRB.     MD Josh in triage to assess patient.     Pt at 1305 weaned off of NRB and placed on 4L NC. Pt sating at 96%.     Pt denies any chest pain.

## 2024-11-05 NOTE — ED NOTES
74-year-old female received in turnover pending CT scan.  She presented with hypoxia, altered mental status.  She was placed on BiPAP.  CT scan performed without evidence of central or large PE.  Will admit for further management.    Critical Care  Performed by: Alcides Chin MD  Authorized by: Alcides Chin MD     Critical care provider statement:     Critical care time (minutes): 42    Critical care time was exclusive of:  Separately billable procedures and treating other patients    Critical care was necessary to treat or prevent imminent or life-threatening deterioration of the following conditions: Acute hypoxic respiratory failure    Critical care was time spent personally by me on the following activities:  Evaluation of patient's response to treatment, examination of patient, ordering and performing treatments and interventions, ordering and review of laboratory studies and re-evaluation of patient's condition      Perfect Serve Consult for Admission  5:34 PM    ED Room Number: ER13/13  Patient Name and age:  Brittney Alston 74 y.o.  female  Working Diagnosis:   1. Acute respiratory failure with hypoxia and hypercarbia        COVID-19 Suspicion: No  Sepsis present:  No    Code Status:  Full Code  Readmission: No  Isolation Requirements: no  Recommended Level of Care: step down  Department: [facility] - [phone]  Consulting Provider: Dr. Kraus    Other: BiPAP for acute hypoxic and hypercapnic respiratory failure         Alcides Chin MD  11/05/24 6167

## 2024-11-05 NOTE — ED PROVIDER NOTES
[facility] EMERGENCY DEPT  EMERGENCY DEPARTMENT ENCOUNTER      Patient Name:  Brittney Alston  MRN:   [id number]  :   1950  Date of Evaluation: 2024  Physician:  Kera Moreno MD    Chief Complaint   Patient presents with    Shortness of Breath       HISTORY OF PRESENT ILLNESS    This is a 74-year-old female with a history of hypertension, hyperlipidemia, hypothyroidism, obesity, presenting with chest pain and shortness of breath that started about a week ago, she developed a sensation of pressure in her chest at rest and her symptoms have worsened until today when her  felt that her speech was slurred.  She is not dysarthric on exam but visibly dyspneic and in respiratory distress, hypoxic to 64% on room air.  She has new bilateral lower extremity edema and was noted to be hypertensive to the 200s systolic upon arrival.  Denies any history of ischemic heart disease to her knowledge or underlying lung disease.  No other systemic complaints.      REVIEW OF SYSTEMS   A review of systems was performed and was negative except as documented in the HPI.     PAST MEDICAL HISTORY     Past Medical History:   Diagnosis Date    Hyperlipidemia     Hypertension     Hypothyroidism     Obesity        PAST SURGICAL HISTORY     Past Surgical History:   Procedure Laterality Date    CHOLECYSTECTOMY      FOOT SURGERY Right     TOTAL HIP ARTHROPLASTY         ALLERGIES   Pholcodine, Codeine, and Seasonal    FAMILY HISTORY   No family history on file.    SOCIAL HISTORY     Social History     Socioeconomic History    Marital status:    Tobacco Use    Smoking status: Former     Current packs/day: 0.00     Types: Cigarettes     Quit date: 2004     Years since quittin.3    Smokeless tobacco: Never   Vaping Use    Vaping status: Never Used   Substance and Sexual Activity    Alcohol use: Not Currently    Drug use: Not Currently     Social Determinants of Health     Financial Resource Strain: Low Risk  (2024)

## 2024-11-06 ENCOUNTER — APPOINTMENT (OUTPATIENT)
Facility: HOSPITAL | Age: 74
End: 2024-11-06
Payer: MEDICARE

## 2024-11-06 ENCOUNTER — APPOINTMENT (OUTPATIENT)
Age: 74
End: 2024-11-06
Payer: MEDICARE

## 2024-11-06 PROBLEM — R06.00 DYSPNEA: Status: ACTIVE | Noted: 2024-11-06

## 2024-11-06 PROBLEM — I50.9 ACUTE CONGESTIVE HEART FAILURE (HCC): Status: ACTIVE | Noted: 2024-11-06

## 2024-11-06 LAB
ALBUMIN SERPL-MCNC: 3.4 G/DL (ref 3.5–5)
ALBUMIN/GLOB SERPL: 1 (ref 1.1–2.2)
ALP SERPL-CCNC: 87 U/L (ref 45–117)
ALT SERPL-CCNC: 26 U/L (ref 12–78)
AMMONIA PLAS-SCNC: 16 UMOL/L
ANION GAP SERPL CALC-SCNC: 2 MMOL/L (ref 2–12)
ANION GAP SERPL CALC-SCNC: 4 MMOL/L (ref 2–12)
ANION GAP SERPL CALC-SCNC: 4 MMOL/L (ref 2–12)
ANION GAP SERPL CALC-SCNC: 6 MMOL/L (ref 2–12)
ARTERIAL PATENCY WRIST A: ABNORMAL
AST SERPL-CCNC: ABNORMAL U/L (ref 15–37)
B PERT DNA SPEC QL NAA+PROBE: NOT DETECTED
BASE EXCESS BLD CALC-SCNC: 7.9 MMOL/L
BASOPHILS # BLD: 0 K/UL (ref 0–0.1)
BASOPHILS NFR BLD: 0 % (ref 0–1)
BDY SITE: ABNORMAL
BILIRUB SERPL-MCNC: 0.6 MG/DL (ref 0.2–1)
BORDETELLA PARAPERTUSSIS BY PCR: NOT DETECTED
BUN SERPL-MCNC: 14 MG/DL (ref 6–20)
BUN SERPL-MCNC: 15 MG/DL (ref 6–20)
BUN SERPL-MCNC: 16 MG/DL (ref 6–20)
BUN SERPL-MCNC: 22 MG/DL (ref 6–20)
BUN/CREAT SERPL: 14 (ref 12–20)
BUN/CREAT SERPL: 15 (ref 12–20)
BUN/CREAT SERPL: 19 (ref 12–20)
BUN/CREAT SERPL: 21 (ref 12–20)
C PNEUM DNA SPEC QL NAA+PROBE: NOT DETECTED
CALCIUM SERPL-MCNC: 8.5 MG/DL (ref 8.5–10.1)
CALCIUM SERPL-MCNC: 8.6 MG/DL (ref 8.5–10.1)
CALCIUM SERPL-MCNC: 8.7 MG/DL (ref 8.5–10.1)
CALCIUM SERPL-MCNC: 8.8 MG/DL (ref 8.5–10.1)
CHLORIDE SERPL-SCNC: 84 MMOL/L (ref 97–108)
CHLORIDE SERPL-SCNC: 85 MMOL/L (ref 97–108)
CHLORIDE SERPL-SCNC: 85 MMOL/L (ref 97–108)
CHLORIDE SERPL-SCNC: 86 MMOL/L (ref 97–108)
CO2 SERPL-SCNC: 35 MMOL/L (ref 21–32)
CO2 SERPL-SCNC: 35 MMOL/L (ref 21–32)
CO2 SERPL-SCNC: 37 MMOL/L (ref 21–32)
CO2 SERPL-SCNC: 37 MMOL/L (ref 21–32)
COMMENT:: NORMAL
CORTIS SERPL-MCNC: 14 UG/DL
CREAT SERPL-MCNC: 0.83 MG/DL (ref 0.55–1.02)
CREAT SERPL-MCNC: 0.97 MG/DL (ref 0.55–1.02)
CREAT SERPL-MCNC: 1.02 MG/DL (ref 0.55–1.02)
CREAT SERPL-MCNC: 1.07 MG/DL (ref 0.55–1.02)
DIFFERENTIAL METHOD BLD: ABNORMAL
ECHO AO ARCH DIAM: 2 CM
ECHO AO ASC DIAM: 3.1 CM
ECHO AO ASCENDING AORTA INDEX: 1.61 CM/M2
ECHO AO ROOT DIAM: 3.2 CM
ECHO AO ROOT INDEX: 1.67 CM/M2
ECHO AV AREA PEAK VELOCITY: 2.4 CM2
ECHO AV AREA VTI: 2.6 CM2
ECHO AV AREA/BSA PEAK VELOCITY: 1.3 CM2/M2
ECHO AV AREA/BSA VTI: 1.4 CM2/M2
ECHO AV MEAN GRADIENT: 8 MMHG
ECHO AV MEAN VELOCITY: 1.4 M/S
ECHO AV PEAK GRADIENT: 16 MMHG
ECHO AV PEAK VELOCITY: 2 M/S
ECHO AV VELOCITY RATIO: 0.85
ECHO AV VTI: 43.7 CM
ECHO BSA: 2 M2
ECHO LA DIAMETER INDEX: 1.77 CM/M2
ECHO LA DIAMETER: 3.4 CM
ECHO LA TO AORTIC ROOT RATIO: 1.06
ECHO LA VOL A-L A2C: 73 ML (ref 22–52)
ECHO LA VOL A-L A4C: 84 ML (ref 22–52)
ECHO LA VOL BP: 74 ML (ref 22–52)
ECHO LA VOL MOD A2C: 70 ML (ref 22–52)
ECHO LA VOL MOD A4C: 77 ML (ref 22–52)
ECHO LA VOL/BSA BIPLANE: 39 ML/M2 (ref 16–34)
ECHO LA VOLUME AREA LENGTH: 80 ML
ECHO LA VOLUME INDEX A-L A2C: 38 ML/M2 (ref 16–34)
ECHO LA VOLUME INDEX A-L A4C: 44 ML/M2 (ref 16–34)
ECHO LA VOLUME INDEX AREA LENGTH: 42 ML/M2 (ref 16–34)
ECHO LA VOLUME INDEX MOD A2C: 36 ML/M2 (ref 16–34)
ECHO LA VOLUME INDEX MOD A4C: 40 ML/M2 (ref 16–34)
ECHO LV E' LATERAL VELOCITY: 5.6 CM/S
ECHO LV E' SEPTAL VELOCITY: 3.4 CM/S
ECHO LV EDV A2C: 90 ML
ECHO LV EDV A4C: 89 ML
ECHO LV EDV BP: 92 ML (ref 56–104)
ECHO LV EDV INDEX A4C: 46 ML/M2
ECHO LV EDV INDEX BP: 48 ML/M2
ECHO LV EDV NDEX A2C: 47 ML/M2
ECHO LV EJECTION FRACTION A2C: 85 %
ECHO LV EJECTION FRACTION A4C: 93 %
ECHO LV EJECTION FRACTION BIPLANE: 90 % (ref 55–100)
ECHO LV ESV A2C: 13 ML
ECHO LV ESV A4C: 7 ML
ECHO LV ESV BP: 9 ML (ref 19–49)
ECHO LV ESV INDEX A2C: 7 ML/M2
ECHO LV ESV INDEX A4C: 4 ML/M2
ECHO LV ESV INDEX BP: 5 ML/M2
ECHO LV FRACTIONAL SHORTENING: 36 % (ref 28–44)
ECHO LV INTERNAL DIMENSION DIASTOLE INDEX: 2.29 CM/M2
ECHO LV INTERNAL DIMENSION DIASTOLIC: 4.4 CM (ref 3.9–5.3)
ECHO LV INTERNAL DIMENSION SYSTOLIC INDEX: 1.46 CM/M2
ECHO LV INTERNAL DIMENSION SYSTOLIC: 2.8 CM
ECHO LV IVSD: 0.9 CM (ref 0.6–0.9)
ECHO LV MASS 2D: 128 G (ref 67–162)
ECHO LV MASS INDEX 2D: 66.7 G/M2 (ref 43–95)
ECHO LV POSTERIOR WALL DIASTOLIC: 0.9 CM (ref 0.6–0.9)
ECHO LV RELATIVE WALL THICKNESS RATIO: 0.41
ECHO LVOT AREA: 2.8 CM2
ECHO LVOT AV VTI INDEX: 0.93
ECHO LVOT DIAM: 1.9 CM
ECHO LVOT MEAN GRADIENT: 6 MMHG
ECHO LVOT PEAK GRADIENT: 12 MMHG
ECHO LVOT PEAK VELOCITY: 1.7 M/S
ECHO LVOT STROKE VOLUME INDEX: 59.9 ML/M2
ECHO LVOT SV: 115.1 ML
ECHO LVOT VTI: 40.6 CM
ECHO MV A VELOCITY: 1.17 M/S
ECHO MV AREA VTI: 3.5 CM2
ECHO MV E DECELERATION TIME (DT): 296.8 MS
ECHO MV E VELOCITY: 0.86 M/S
ECHO MV E/A RATIO: 0.74
ECHO MV E/E' LATERAL: 15.36
ECHO MV E/E' RATIO (AVERAGED): 20.33
ECHO MV E/E' SEPTAL: 25.29
ECHO MV LVOT VTI INDEX: 0.81
ECHO MV MAX VELOCITY: 1.2 M/S
ECHO MV MEAN GRADIENT: 2 MMHG
ECHO MV MEAN VELOCITY: 0.7 M/S
ECHO MV PEAK GRADIENT: 6 MMHG
ECHO MV VTI: 32.7 CM
ECHO PULMONARY ARTERY END DIASTOLIC PRESSURE: 1 MMHG
ECHO PV MAX VELOCITY: 1.3 M/S
ECHO PV PEAK GRADIENT: 7 MMHG
ECHO PV REGURGITANT MAX VELOCITY: 0.6 M/S
ECHO RV FREE WALL PEAK S': 13.9 CM/S
ECHO RV INTERNAL DIMENSION: 4 CM
ECHO RV TAPSE: 2.6 CM (ref 1.7–?)
ECHO TV REGURGITANT MAX VELOCITY: 1.64 M/S
ECHO TV REGURGITANT PEAK GRADIENT: 11 MMHG
EKG ATRIAL RATE: 72 BPM
EKG DIAGNOSIS: NORMAL
EKG P AXIS: 63 DEGREES
EKG P-R INTERVAL: 160 MS
EKG Q-T INTERVAL: 408 MS
EKG QRS DURATION: 130 MS
EKG QTC CALCULATION (BAZETT): 446 MS
EKG R AXIS: 16 DEGREES
EKG T AXIS: 122 DEGREES
EKG VENTRICULAR RATE: 72 BPM
EOSINOPHIL # BLD: 0 K/UL (ref 0–0.4)
EOSINOPHIL NFR BLD: 0 % (ref 0–7)
ERYTHROCYTE [DISTWIDTH] IN BLOOD BY AUTOMATED COUNT: 13.7 % (ref 11.5–14.5)
FLUAV SUBTYP SPEC NAA+PROBE: NOT DETECTED
FLUBV RNA SPEC QL NAA+PROBE: NOT DETECTED
GAS FLOW.O2 O2 DELIVERY SYS: ABNORMAL
GAS FLOW.O2 SETTING OXYMISER: 26 BPM
GLOBULIN SER CALC-MCNC: 3.3 G/DL (ref 2–4)
GLUCOSE SERPL-MCNC: 119 MG/DL (ref 65–100)
GLUCOSE SERPL-MCNC: 128 MG/DL (ref 65–100)
GLUCOSE SERPL-MCNC: 136 MG/DL (ref 65–100)
GLUCOSE SERPL-MCNC: 173 MG/DL (ref 65–100)
HADV DNA SPEC QL NAA+PROBE: NOT DETECTED
HCO3 BLD-SCNC: 37.7 MMOL/L (ref 21–28)
HCOV 229E RNA SPEC QL NAA+PROBE: NOT DETECTED
HCOV HKU1 RNA SPEC QL NAA+PROBE: NOT DETECTED
HCOV NL63 RNA SPEC QL NAA+PROBE: NOT DETECTED
HCOV OC43 RNA SPEC QL NAA+PROBE: NOT DETECTED
HCT VFR BLD AUTO: 42.9 % (ref 35–47)
HGB BLD-MCNC: 13.8 G/DL (ref 11.5–16)
HMPV RNA SPEC QL NAA+PROBE: NOT DETECTED
HPIV1 RNA SPEC QL NAA+PROBE: NOT DETECTED
HPIV2 RNA SPEC QL NAA+PROBE: NOT DETECTED
HPIV3 RNA SPEC QL NAA+PROBE: NOT DETECTED
HPIV4 RNA SPEC QL NAA+PROBE: NOT DETECTED
IMM GRANULOCYTES # BLD AUTO: 0 K/UL (ref 0–0.04)
IMM GRANULOCYTES NFR BLD AUTO: 1 % (ref 0–0.5)
LYMPHOCYTES # BLD: 1.2 K/UL (ref 0.8–3.5)
LYMPHOCYTES NFR BLD: 20 % (ref 12–49)
M PNEUMO DNA SPEC QL NAA+PROBE: NOT DETECTED
MAGNESIUM SERPL-MCNC: NORMAL MG/DL (ref 1.6–2.4)
MCH RBC QN AUTO: 29.7 PG (ref 26–34)
MCHC RBC AUTO-ENTMCNC: 32.2 G/DL (ref 30–36.5)
MCV RBC AUTO: 92.5 FL (ref 80–99)
MONOCYTES # BLD: 0.1 K/UL (ref 0–1)
MONOCYTES NFR BLD: 1 % (ref 5–13)
NEUTS SEG # BLD: 4.8 K/UL (ref 1.8–8)
NEUTS SEG NFR BLD: 78 % (ref 32–75)
NRBC # BLD: 0 K/UL (ref 0–0.01)
NRBC BLD-RTO: 0 PER 100 WBC
O2/TOTAL GAS SETTING VFR VENT: 35 %
OSMOLALITY SERPL: 264 MOSM/KG H2O
OSMOLALITY UR: 421 MOSM/KG H2O
PCO2 BLD: 74.2 MMHG (ref 35–48)
PEEP RESPIRATORY: 6 CMH2O
PH BLD: 7.31 (ref 7.35–7.45)
PLATELET # BLD AUTO: 362 K/UL (ref 150–400)
PMV BLD AUTO: 10.2 FL (ref 8.9–12.9)
PO2 BLD: 77 MMHG (ref 83–108)
POTASSIUM SERPL-SCNC: 4.7 MMOL/L (ref 3.5–5.1)
POTASSIUM SERPL-SCNC: 5.2 MMOL/L (ref 3.5–5.1)
POTASSIUM SERPL-SCNC: 5.3 MMOL/L (ref 3.5–5.1)
POTASSIUM SERPL-SCNC: ABNORMAL MMOL/L (ref 3.5–5.1)
PRESSURE SUPPORT SETTING VENT: 28 CMH2O
PROT SERPL-MCNC: 6.7 G/DL (ref 6.4–8.2)
RBC # BLD AUTO: 4.64 M/UL (ref 3.8–5.2)
RSV RNA SPEC QL NAA+PROBE: NOT DETECTED
RV+EV RNA SPEC QL NAA+PROBE: NOT DETECTED
SAO2 % BLD: 93.1 % (ref 92–97)
SARS-COV-2 RNA RESP QL NAA+PROBE: NOT DETECTED
SODIUM SERPL-SCNC: 123 MMOL/L (ref 136–145)
SODIUM SERPL-SCNC: 124 MMOL/L (ref 136–145)
SODIUM SERPL-SCNC: 126 MMOL/L (ref 136–145)
SODIUM SERPL-SCNC: 127 MMOL/L (ref 136–145)
SODIUM UR-SCNC: 10 MMOL/L
SPECIMEN HOLD: NORMAL
SPECIMEN HOLD: NORMAL
SPECIMEN TYPE: ABNORMAL
VENTILATION MODE VENT: ABNORMAL
WBC # BLD AUTO: 6.2 K/UL (ref 3.6–11)

## 2024-11-06 PROCEDURE — 82803 BLOOD GASES ANY COMBINATION: CPT

## 2024-11-06 PROCEDURE — 94761 N-INVAS EAR/PLS OXIMETRY MLT: CPT

## 2024-11-06 PROCEDURE — 80048 BASIC METABOLIC PNL TOTAL CA: CPT

## 2024-11-06 PROCEDURE — 94660 CPAP INITIATION&MGMT: CPT

## 2024-11-06 PROCEDURE — 2580000003 HC RX 258

## 2024-11-06 PROCEDURE — 87449 NOS EACH ORGANISM AG IA: CPT

## 2024-11-06 PROCEDURE — 84300 ASSAY OF URINE SODIUM: CPT

## 2024-11-06 PROCEDURE — 36415 COLL VENOUS BLD VENIPUNCTURE: CPT

## 2024-11-06 PROCEDURE — 6360000002 HC RX W HCPCS

## 2024-11-06 PROCEDURE — 85025 COMPLETE CBC W/AUTO DIFF WBC: CPT

## 2024-11-06 PROCEDURE — 83935 ASSAY OF URINE OSMOLALITY: CPT

## 2024-11-06 PROCEDURE — 6370000000 HC RX 637 (ALT 250 FOR IP)

## 2024-11-06 PROCEDURE — 6360000004 HC RX CONTRAST MEDICATION: Performed by: FAMILY MEDICINE

## 2024-11-06 PROCEDURE — 93010 ELECTROCARDIOGRAM REPORT: CPT | Performed by: INTERNAL MEDICINE

## 2024-11-06 PROCEDURE — 2700000000 HC OXYGEN THERAPY PER DAY

## 2024-11-06 PROCEDURE — 6360000002 HC RX W HCPCS: Performed by: NURSE PRACTITIONER

## 2024-11-06 PROCEDURE — 36600 WITHDRAWAL OF ARTERIAL BLOOD: CPT

## 2024-11-06 PROCEDURE — 94664 DEMO&/EVAL PT USE INHALER: CPT

## 2024-11-06 PROCEDURE — 94640 AIRWAY INHALATION TREATMENT: CPT

## 2024-11-06 PROCEDURE — APPSS45 APP SPLIT SHARED TIME 31-45 MINUTES: Performed by: NURSE PRACTITIONER

## 2024-11-06 PROCEDURE — 83930 ASSAY OF BLOOD OSMOLALITY: CPT

## 2024-11-06 PROCEDURE — 82140 ASSAY OF AMMONIA: CPT

## 2024-11-06 PROCEDURE — 2580000003 HC RX 258: Performed by: NURSE PRACTITIONER

## 2024-11-06 PROCEDURE — 2000000000 HC ICU R&B

## 2024-11-06 PROCEDURE — 99222 1ST HOSP IP/OBS MODERATE 55: CPT | Performed by: INTERNAL MEDICINE

## 2024-11-06 PROCEDURE — 82533 TOTAL CORTISOL: CPT

## 2024-11-06 PROCEDURE — 2060000000 HC ICU INTERMEDIATE R&B

## 2024-11-06 PROCEDURE — C8929 TTE W OR WO FOL WCON,DOPPLER: HCPCS

## 2024-11-06 PROCEDURE — 83735 ASSAY OF MAGNESIUM: CPT

## 2024-11-06 PROCEDURE — 93306 TTE W/DOPPLER COMPLETE: CPT | Performed by: INTERNAL MEDICINE

## 2024-11-06 PROCEDURE — 0202U NFCT DS 22 TRGT SARS-COV-2: CPT

## 2024-11-06 PROCEDURE — 80053 COMPREHEN METABOLIC PANEL: CPT

## 2024-11-06 PROCEDURE — 87899 AGENT NOS ASSAY W/OPTIC: CPT

## 2024-11-06 RX ORDER — NOREPINEPHRINE BITARTRATE 0.06 MG/ML
1-100 INJECTION, SOLUTION INTRAVENOUS CONTINUOUS
Status: DISCONTINUED | OUTPATIENT
Start: 2024-11-06 | End: 2024-11-06

## 2024-11-06 RX ORDER — FUROSEMIDE 10 MG/ML
40 INJECTION INTRAMUSCULAR; INTRAVENOUS ONCE
Status: DISCONTINUED | OUTPATIENT
Start: 2024-11-06 | End: 2024-11-06

## 2024-11-06 RX ADMIN — AZITHROMYCIN MONOHYDRATE 500 MG: 500 INJECTION, POWDER, LYOPHILIZED, FOR SOLUTION INTRAVENOUS at 02:47

## 2024-11-06 RX ADMIN — METHYLPREDNISOLONE SODIUM SUCCINATE 60 MG: 125 INJECTION, POWDER, LYOPHILIZED, FOR SOLUTION INTRAMUSCULAR; INTRAVENOUS at 14:54

## 2024-11-06 RX ADMIN — PERFLUTREN 1.5 ML: 6.52 INJECTION, SUSPENSION INTRAVENOUS at 14:23

## 2024-11-06 RX ADMIN — SODIUM CHLORIDE, PRESERVATIVE FREE 10 ML: 5 INJECTION INTRAVENOUS at 21:10

## 2024-11-06 RX ADMIN — FUROSEMIDE 20 MG: 10 INJECTION, SOLUTION INTRAMUSCULAR; INTRAVENOUS at 18:29

## 2024-11-06 RX ADMIN — IPRATROPIUM BROMIDE AND ALBUTEROL SULFATE 1 DOSE: 2.5; .5 SOLUTION RESPIRATORY (INHALATION) at 11:35

## 2024-11-06 RX ADMIN — BUDESONIDE INHALATION 500 MCG: 0.5 SUSPENSION RESPIRATORY (INHALATION) at 19:37

## 2024-11-06 RX ADMIN — IPRATROPIUM BROMIDE AND ALBUTEROL SULFATE 1 DOSE: 2.5; .5 SOLUTION RESPIRATORY (INHALATION) at 19:30

## 2024-11-06 RX ADMIN — ATORVASTATIN CALCIUM 20 MG: 20 TABLET, FILM COATED ORAL at 21:09

## 2024-11-06 RX ADMIN — METHYLPREDNISOLONE SODIUM SUCCINATE 60 MG: 125 INJECTION, POWDER, LYOPHILIZED, FOR SOLUTION INTRAMUSCULAR; INTRAVENOUS at 02:44

## 2024-11-06 RX ADMIN — FUROSEMIDE 20 MG: 10 INJECTION, SOLUTION INTRAMUSCULAR; INTRAVENOUS at 09:41

## 2024-11-06 RX ADMIN — BUDESONIDE INHALATION 500 MCG: 0.5 SUSPENSION RESPIRATORY (INHALATION) at 07:28

## 2024-11-06 RX ADMIN — ARFORMOTEROL TARTRATE 15 MCG: 15 SOLUTION RESPIRATORY (INHALATION) at 19:37

## 2024-11-06 RX ADMIN — SODIUM CHLORIDE, PRESERVATIVE FREE 10 ML: 5 INJECTION INTRAVENOUS at 09:42

## 2024-11-06 RX ADMIN — WATER 2000 MG: 1 INJECTION INTRAMUSCULAR; INTRAVENOUS; SUBCUTANEOUS at 02:44

## 2024-11-06 RX ADMIN — IPRATROPIUM BROMIDE AND ALBUTEROL SULFATE 1 DOSE: 2.5; .5 SOLUTION RESPIRATORY (INHALATION) at 15:49

## 2024-11-06 RX ADMIN — IPRATROPIUM BROMIDE AND ALBUTEROL SULFATE 1 DOSE: 2.5; .5 SOLUTION RESPIRATORY (INHALATION) at 07:23

## 2024-11-06 RX ADMIN — ARFORMOTEROL TARTRATE 15 MCG: 15 SOLUTION RESPIRATORY (INHALATION) at 07:28

## 2024-11-06 RX ADMIN — ENOXAPARIN SODIUM 40 MG: 100 INJECTION SUBCUTANEOUS at 09:41

## 2024-11-06 ASSESSMENT — PAIN SCALES - GENERAL
PAINLEVEL_OUTOF10: 0
PAINLEVEL_OUTOF10: 0

## 2024-11-06 ASSESSMENT — ENCOUNTER SYMPTOMS
WHEEZING: 1
GASTROINTESTINAL NEGATIVE: 1
SHORTNESS OF BREATH: 1

## 2024-11-06 NOTE — PROGRESS NOTES
Spiritual Health History and Assessment/Progress Note  [facility]    Attempted Encounter,  ,  ,      Name: Brittney Alston MRN: [id number]    Age: 74 y.o.     Sex: female   Language: English   Nondenominational: Unknown   Acute respiratory failure with hypoxia and hypercarbia     Date: 11/6/2024            Total Time Calculated: 7 min              Spiritual Assessment Unable to assess in [facility] A4 INTENSIVE CARE UNIT        Referral/Consult From: Rounding   Encounter Overview/Reason: Attempted Encounter  Service Provided For: Patient not available    Madiha, Belief, Meaning:   Patient unable to assess at this time  Family/Friends Patient and family sleeping.       Importance and Influence:  Patient unable to assess at this time  Family/Friends Unable to assess at this time.     Community:  Patient Unable to assess at this time.  Family/Friends Unable to assess at this time    Assessment and Plan of Care:   Patient Interventions include: Unable to assess.  Family/Friends Interventions include: Unable to assess.    Patient Plan of Care: Spiritual Care available upon further referral  Family/Friends Plan of Care: Spiritual Care available upon further referral    Chart review. Rounded on ICU. Attempted encounter. Unable to assess. Patient unavailable. Please contact spiritual health services for further assistance needed.    Electronically signed by Chaplain Sriram on 11/6/2024 at 3:28 PM

## 2024-11-06 NOTE — PLAN OF CARE
Problem: Discharge Planning  Goal: Discharge to home or other facility with appropriate resources  Outcome: Progressing  Flowsheets (Taken 11/6/2024 0502)  Discharge to home or other facility with appropriate resources: Identify barriers to discharge with patient and caregiver     Problem: Pain  Goal: Verbalizes/displays adequate comfort level or baseline comfort level  Outcome: Progressing  Flowsheets (Taken 11/6/2024 0502)  Verbalizes/displays adequate comfort level or baseline comfort level: Encourage patient to monitor pain and request assistance

## 2024-11-06 NOTE — PROGRESS NOTES
No urinary output noted from previous ED RN and writer's shift up to 0600. Patient stating that she has the urge to urinate but is unable. Writer straight cathed patient 650cc's clear yellow urine obtained.

## 2024-11-06 NOTE — CONSULTS
CRITICAL CARE  CONSULT NOTE      Name: Brittney Alston   : 1950   MRN: [id number]   Date: 2024      REASON FOR CONSULT: Acute Hypoxic/Hypercapnic Respiratory Failure      ASSESSMENT & PLAN   Neuro:  Altered Mental Status  -In setting of hypercapnia  -Neuro checks per protocol     CV:  Sinus Bradycardia   ? CHF  Hx HTN, HLD  -Home medications: Metoprolol, Lipitor, amlodipine, lisinopril - hold in setting of hypotension and pending LFTs  -No echo on file   -EKG in ED NSR w/ LBBB, chronicity unknown, now SB on tele   -, Trop neg x 2     Pulm:  Acute Hypoxic & Hypercapnic respiratory failure, unclear suspect multifactorial in setting of ACOPDE and OHS +/- CHF  -CT chest without PE but demonstrating emphysematous changes of the lungs with basilar GGOs  -COVID & influenza negative  -Continue NIPPV  -Repeat ABG upon arrival   -TTE  -Diuresis  -Scheduled duonebs/Pulmicort   -Solumedrol 60 BID, Abx as below     Renal:  Hyponatremia (124), suspected hypervolemic   -sCr 0.61  -Send serum & urine osmo/NA  -Trend BMP and Lytes     GI:  Obesity BMI 36.85  -NPO while on Bipap    Heme:   No active issues   -Trend CBC    Endo:  Hypothyroidism  -Resume home Synthroid    ID:  ACOPE/CAP   -Rocephin and Azithromycin   -Strep/legionella/MRSA and sputum cx     SUBJECTIVE   Brittney Alston is a 74 y.o. with PMH of HTN, HLD, and hypothyroidism who presents to [facility] ER on  for evaluation of shortness of breath & nausea X 1 day.  Patient presented to ER hypertensive and hypoxic (64% on RA) with ABG demonstrating respiratory acidosis & hypoxia (pH 7.24/CO2 88/02 77), other labs significant for leukocytosis (13.5) and hyponatremia (119).  CTA negative for PE.  NIPPV applied with minimal improvement in acidosis demonstrated on repeat ABG.  Delta increase on BiPAP and ICU consulted for admission.     Review of Systems   Unable to perform ROS: Other (AMS)   Constitutional: Negative.    HENT: Negative.    actively involved in patient care as well as the coordination of care.  This does not include any procedural time which has been billed separately.    Betty Cornell, APRN - CNP   Critical Care Medicine  [facility]

## 2024-11-06 NOTE — ED NOTES
Bedside and Verbal shift change report given to Nisha RN (oncoming nurse) by Troy RN (offgoing nurse). Report included the following information Nurse Handoff Report, ED SBAR, MAR, and Recent Results.

## 2024-11-06 NOTE — CARE COORDINATION
11/6/24  11:57 AM        Care Management Initial Assessment  11/6/2024 11:57 AM  If patient is discharged prior to next notation, then this note serves as note for discharge by case management.    Reason for Admission:   Hyponatremia [E87.1]  Acute respiratory failure with hypoxia and hypercarbia [J96.01, J96.02]         Patient Admission Status: Inpatient  Date Admitted to INP: 11/5/24  RUR: Readmission Risk Score: 8.5    Hospitalization in the last 30 days (Readmission):  No        Advance Care Planning:  Code Status: Full Code  Primary Healthcare Decision Maker: (P) Named in Scanned ACP Document  Primary Decision Maker: Andrew Alston - Spouse - [phone]   Advance Directive: has an advanced directive - a copy has been provided     __________________________________________________________________________  Assessment:      11/06/24 1152   Service Assessment   Patient Orientation Alert and Oriented   Cognition Alert   History Provided By Patient;Significant Other   Primary Caregiver Self   Support Systems Spouse/Significant Other   Patient's Healthcare Decision Maker is: Named in Scanned ACP Document   PCP Verified by CM Yes   Last Visit to PCP Within last 3 months   Prior Functional Level Independent in ADLs/IADLs   Current Functional Level Independent in ADLs/IADLs   Can patient return to prior living arrangement Yes   Ability to make needs known: Good   Family able to assist with home care needs: Yes   Would you like for me to discuss the discharge plan with any other family members/significant others, and if so, who? Yes   Financial Resources Medicare   Social/Functional History   Lives With Spouse   Type of Home House   Home Layout Two level   Home Access Stairs to enter with rails   Entrance Stairs - Number of Steps 1   Bathroom Equipment None   Home Equipment None  (has a walking stick if needed)   ADL Assistance Independent   Homemaking Assistance Independent   Ambulation Assistance Independent

## 2024-11-06 NOTE — PROGRESS NOTES
Occupational Therapy:  11/06/24    Orders received and appreciated, chart reviewed, spoke to PT who discussed with RN. Pt maxed out on BiPAP and still struggling with simple conversation. In agreement to defer as pt is not appropriate for therapy intervention at this time. Will follow up to initiate OT eval as appropriate.     Thank you,  Lucy Grey, OTR/L

## 2024-11-06 NOTE — PROGRESS NOTES
[id number] [id number]   Office [phone]  Fax [phone]          Subjective / Objective     Subjective  Overnight Events: Transferred to ICU due to continued hypercapnia with maxed out Bipap.   Patient seen and examined at bedside. Reports feeling better this AM. Reports she has been told by prior doctors that she has hx of HF and COPD. Still feels slightly SOB. Denies CP, N/V, dysuria.     Respiratory:   BiPAP FiO2 35, 25 O2 flow rate  Vitals:    11/06/24 0445   BP: 129/78   Pulse: 51   Resp: 26   Temp:    SpO2: 96%     Physical Examination:   General appearance - asleep but arousable, well appearing, and in no distress  Chest - end expiratory wheezing throughout, rhonchorous, no crackles.  Heart - bradycardic, regular rhythm, normal S1, S2, no murmurs, rubs, clicks or gallops,   Abdomen - soft, nontender, nondistended, no masses or organomegaly  Neurological - alert, oriented, normal speech, no focal findings  Skin - warm, dry. No notable rashes  Extremities - peripheral pulses normal, +4 pedal edema bilaterally, no clubbing or cyanosis  Psychiatric - normal speech and thought processes    I/O:  No intake/output data recorded.  Inpatient Medications    Current Facility-Administered Medications   Medication Dose Route Frequency    norepinephrine (LEVOPHED) 16 mg in sodium chloride 0.9 % 250 mL infusion  1-100 mcg/min IntraVENous Continuous    methylPREDNISolone sodium succ (SOLU-MEDROL) 60 mg in sterile water 0.96 mL injection  60 mg IntraVENous Q12H    cefTRIAXone (ROCEPHIN) 2,000 mg in sterile water 20 mL IV syringe  2,000 mg IntraVENous Q24H    azithromycin (ZITHROMAX) 500 mg in sodium chloride 0.9 % 250 mL IVPB ([id number])  500 mg IntraVENous Q24H    [Held by provider] amLODIPine (NORVASC) tablet 10 mg  10 mg Oral Daily    ascorbic acid (VITAMIN C) tablet 1,000 mg  1,000 mg Oral Daily    atorvastatin (LIPITOR) tablet 20 mg  20 mg Oral Nightly    levothyroxine (SYNTHROID) tablet   --  36*  --  37*   BUN 11  --  10  --  14   MG Hemolyzed, Recollection Recommended  --   --  2.0 Hemolyzed, Recollection Recommended   ALT Hemolyzed, Recollection Recommended  --   --   --  26   INR  --  1.0  --   --   --      Imaging/procedures:            Assessment and Plan     Brittney Alston is a 74 y.o. female with PMH of reported HF, COPD, HTN, HLD, hypothyroidism, and osteoporosis who presented with nausea and SOB and is admitted for AHHRF.     AHHRF: Likely multifactorial. Has reported hx of HF and COPD, and exam supports with volume overload and wheezing. Imaging consistent with emphysema, peribronchial thickening, decreased lung volumes. Also considering infectious etiology but no obvious PNA or other source. Less suspicion for ACS but LBBB on EKG concerning, though no prior EKGs to compare. ABG initially with hypercapnia pCO3 88>70 post bipap. Mentation also improving with Bipap.   - ICU level of care; appreciate intensivist's recs  - continue bipap, NPO, repeat ABG per ICU  - TTE  - daily weights, elevate HOB, strict I/O  - pending strep pneumo, legionella antigen, resp cx  - Cardiology c/s; appreciate recs  - lasix 20mg IV BID  - DuoNebs  - Brovana/Pulmicort  - IV Solumed 60mg q12h  - IV Azithromycin 500mg q24h, IV CTX 2g q24h (11/5-)  - daily CBC, CMP, Mg    Hyponatremia: could contribute to confusion on admission. Correcting without intentional intervention so far. May be 2/2 CHF or SIADH in setting of critical illness. Less likely cirrhosis given nml LFTs and NH3. Mentation also improving.   - fluid restriction when off BiPAP  - urine lytes  - BMP q6h    HTN  HLD: continue home lisinopril 40mg qd, amlodipine 10mg qd, toprol 100mg qd, lipitor 40mg qd.  - holding home meds iso intermittent hypotension  - continue home lipitor     Hypothyroidism: continue home levothyroxine 50mcg qam. TSH 1.82 here.   - continue home med     Osteoporosis: on home fosamax weekly.  - hold home med     Obesity: Body mass

## 2024-11-06 NOTE — PROGRESS NOTES
CRITICAL CARE  CONSULT NOTE      Name: Brittney Alston   : 1950   MRN: [id number]   Date: 2024      REASON FOR CONSULT: Acute Hypoxic/Hypercapnic Respiratory Failure      ASSESSMENT & PLAN     AECOPD  Congestive heart failure  Possible pneumonia  Hyponatremia  Hyperkalemia    PLAN:  N: Encephalopathy. Resolved.    CV: CHF. Continue diuretics.Hold metoprolol. Checking Echo.    Resp: AECOPD. Hypercapnia. Nebs q6h. Solumedrol 60q12h. Azithromycin. CT chest reviewed. Trial off bipap. Would recommend using bipap overnight. Checking respiratory viral panel.    Renal: trend urine output. Hypo Na, hyperkalemia noted - ? Hypervolemia, but adrenal insufficiency is a consideration. Check cortisol. Repeat BMP in pm.    Heme: stable.    ID: Ctx/azithro x 5 days.    Endo: stable    GI: stable. Cardiac Diet    Lovenox.      SUBJECTIVE   Brittney Alston is a 74 y.o. with PMH of HTN, HLD, and hypothyroidism who presents to [facility] ER on  for evaluation of shortness of breath & nausea X 1 day.  Patient presented to ER hypertensive and hypoxic (64% on RA) with ABG demonstrating respiratory acidosis & hypoxia (pH 7.24/CO2 88/02 77), other labs significant for leukocytosis (13.5) and hyponatremia (119).  CTA negative for PE.  NIPPV applied with minimal improvement in acidosis demonstrated on repeat ABG.  Delta increase on BiPAP and ICU consulted for admission.     : Improved mental status and work of breathing. Unclear urine output.      Review of Systems   Unable to perform ROS: Other (AMS)   Constitutional: Negative.    HENT: Negative.     Respiratory:  Positive for shortness of breath and wheezing.    Cardiovascular:  Positive for leg swelling. Negative for chest pain and palpitations.   Gastrointestinal: Negative.    Genitourinary: Negative.    Musculoskeletal: Negative.    Skin: Negative.    Neurological: Negative.    Psychiatric/Behavioral: Negative.       OBJECTIVE   /78   Pulse 51   Temp 97.5

## 2024-11-06 NOTE — CONSULTS
[facility] CARDIOLOGY                    Cardiology Care Note     [x]Initial Encounter     []Follow-up    Patient Name: Brittney Alston - :1950 - MRN:[id number]  Primary Cardiologist:   Consulting Cardiologist: Porfirio Logan MD     Reason for encounter: L BBB, nausea, hypoxia     HPI:       Brittney Alston is a 74 y.o. female with PMH of HTN, HLD, hypothyroidism, osteoporosis  who presents to the ER with about one week of SOB and nausea. Has also had increasing leg swelling over 2 months.   SPO2 64 % in ED      Denies CP, vomiting, changes to bowel habits, fever/chills. Denies h/o lung or heart disease.      add to hx : daytime somnolence, talking in her sleep , decrease activity level leg swelling for many months. No cardiac hx    Subjective:      Brittney Alston reports dyspnea and fatigue.     Assessment and Plan     1 acute hypoxic resp failure:   - mixed picture (CHF. NORA, emphysema  - pBNP 494  - chest CT with no PE  - Bi Pap  - IV diuresis  - I/O  - ECHO for LV/RV function   - nebs  - IV steroids     2 hyponatremia:    Nausea may be related     3 incidental LBBB:   - will need ischemic eval stress vs cath based upon ECHO     4 HTN:   - Holding home meds given low BP    5 hypothyroidism:     6. obesity  Body mass index is 36.85 kg/m².        ____________________________________________________________    Cardiac testing  No results found for this or any previous visit.          Most recent HS troponins:  Recent Labs     24  1319 24  1759   TROPHS 11 9       ECG:   Encounter Date: 24   EKG 12 Lead   Result Value    Ventricular Rate 72    Atrial Rate 72    P-R Interval 160    QRS Duration 130    Q-T Interval 408    QTc Calculation (Bazett) 446    P Axis 63    R Axis 16    T Axis 122    Diagnosis      Normal sinus rhythm  Possible Left atrial enlargement  Left bundle branch block  Abnormal ECG  No previous ECGs available  Confirmed by Candice Colon ([id number]) on 2024  bronchial wall thickening.  PLEURA: No pleural effusion or pneumothorax.  LUNGS: Mosaic attenuation of the lungs. Emphysematous change of the lungs.  Groundglass and consolidative opacities with a basilar predominance.  UPPER ABDOMEN: Partially imaged. No acute pathology.  BONES: No aggressive bone lesion or fracture.    Impression  No evidence of pulmonary embolism.    Nonspecific findings of mosaic attenuation of the lungs but in the setting of  scattered mucus plugging and bronchial wall thickening suggestive of small  airway disease.    Basilar opacities as detailed above may represent atelectasis although  infectious/inflammatory process could appear similarly and would need to be  excluded clinically.    Emphysema changes lungs.      Electronically signed by Nawaf Romero      Lab Results   Component Value Date    WBC 6.2 11/06/2024    HGB 13.8 11/06/2024    HCT 42.9 11/06/2024    MCV 92.5 11/06/2024     11/06/2024       No results for input(s): \"CHOL\", \"HDLC\", \"LDLC\", \"HBA1C\" in the last 72 hours.    Invalid input(s): \"TGL\"    Lab Results   Component Value Date/Time     11/06/2024 06:35 AM    K 5.3 11/06/2024 06:35 AM    CL 85 11/06/2024 06:35 AM    CO2 35 11/06/2024 06:35 AM    BUN 16 11/06/2024 06:35 AM    CREATININE 0.83 11/06/2024 06:35 AM    GLUCOSE 119 11/06/2024 06:35 AM    CALCIUM 8.6 11/06/2024 06:35 AM    LABGLOM 75 11/06/2024 06:35 AM      No results found for: \"BNP\"        Current meds:    Current Facility-Administered Medications:     norepinephrine (LEVOPHED) 16 mg in sodium chloride 0.9 % 250 mL infusion, 1-100 mcg/min, IntraVENous, Continuous, Betty Cornell APRN - CNP    methylPREDNISolone sodium succ (SOLU-MEDROL) 60 mg in sterile water 0.96 mL injection, 60 mg, IntraVENous, Q12H, Betty Cornell APRN - CNP, 60 mg at 11/06/24 0244    cefTRIAXone (ROCEPHIN) 2,000 mg in sterile water 20 mL IV syringe, 2,000 mg, IntraVENous, Q24H, Betty Cornell, APRN - CNP, 2,000 mg at

## 2024-11-06 NOTE — PROGRESS NOTES
Physical Therapy     Consult received, chart reviewed.  Patient maxed out on Bipap and still struggling with simple conversation.  RN requesting deferral, not appropriate for therapy interventions at this time.  Will defer and follow as appropriate.    Ivette Wetzel, MS, PT

## 2024-11-06 NOTE — FLOWSHEET NOTE
Patient arrived to ICU bed 464. Denies pain. AOX3. Pt is tolerating BIPAP. CHG bath given. Difficult to converse in detail d/t BIPAP and patient's hearing difficulties.   11/06/24 0035   Vital Signs   Temp 97.5 °F (36.4 °C)   Temp Source Oral   Pulse 55   Heart Rate Source Monitor   Respirations 15   BP (!) 107/57   MAP (Calculated) 74   MAP (mmHg) 73   BP Location Left upper arm   BP Method Automatic   Patient Position Semi fowlers

## 2024-11-06 NOTE — PROGRESS NOTES
attended rounds in the ICU as part of the Interdisciplinary team where the patient's ongoing care was discussed. Please contact [facility] for further referrals.    Swapnil Andrea MDiv  Staff   Paging Service [phone] (ROSS)

## 2024-11-07 LAB
ALBUMIN SERPL-MCNC: 3.2 G/DL (ref 3.5–5)
ALBUMIN/GLOB SERPL: 1.2 (ref 1.1–2.2)
ALP SERPL-CCNC: 97 U/L (ref 45–117)
ALT SERPL-CCNC: 18 U/L (ref 12–78)
ANION GAP SERPL CALC-SCNC: 4 MMOL/L (ref 2–12)
AST SERPL-CCNC: 22 U/L (ref 15–37)
BASOPHILS # BLD: 0 K/UL (ref 0–0.1)
BASOPHILS NFR BLD: 0 % (ref 0–1)
BILIRUB SERPL-MCNC: 0.3 MG/DL (ref 0.2–1)
BUN SERPL-MCNC: 19 MG/DL (ref 6–20)
BUN/CREAT SERPL: 23 (ref 12–20)
CALCIUM SERPL-MCNC: 8.3 MG/DL (ref 8.5–10.1)
CHLORIDE SERPL-SCNC: 93 MMOL/L (ref 97–108)
CO2 SERPL-SCNC: 36 MMOL/L (ref 21–32)
CREAT SERPL-MCNC: 0.81 MG/DL (ref 0.55–1.02)
DIFFERENTIAL METHOD BLD: ABNORMAL
EOSINOPHIL # BLD: 0 K/UL (ref 0–0.4)
EOSINOPHIL NFR BLD: 0 % (ref 0–7)
ERYTHROCYTE [DISTWIDTH] IN BLOOD BY AUTOMATED COUNT: 13.7 % (ref 11.5–14.5)
GLOBULIN SER CALC-MCNC: 2.6 G/DL (ref 2–4)
GLUCOSE SERPL-MCNC: 161 MG/DL (ref 65–100)
HCT VFR BLD AUTO: 39.7 % (ref 35–47)
HGB BLD-MCNC: 12.8 G/DL (ref 11.5–16)
IMM GRANULOCYTES # BLD AUTO: 0.1 K/UL (ref 0–0.04)
IMM GRANULOCYTES NFR BLD AUTO: 1 % (ref 0–0.5)
LYMPHOCYTES # BLD: 1.2 K/UL (ref 0.8–3.5)
LYMPHOCYTES NFR BLD: 11 % (ref 12–49)
MAGNESIUM SERPL-MCNC: 2.4 MG/DL (ref 1.6–2.4)
MCH RBC QN AUTO: 30.3 PG (ref 26–34)
MCHC RBC AUTO-ENTMCNC: 32.2 G/DL (ref 30–36.5)
MCV RBC AUTO: 93.9 FL (ref 80–99)
MONOCYTES # BLD: 0.8 K/UL (ref 0–1)
MONOCYTES NFR BLD: 7 % (ref 5–13)
NEUTS SEG # BLD: 9.3 K/UL (ref 1.8–8)
NEUTS SEG NFR BLD: 81 % (ref 32–75)
NRBC # BLD: 0 K/UL (ref 0–0.01)
NRBC BLD-RTO: 0 PER 100 WBC
PLATELET # BLD AUTO: 280 K/UL (ref 150–400)
PMV BLD AUTO: 10.3 FL (ref 8.9–12.9)
POTASSIUM SERPL-SCNC: 5 MMOL/L (ref 3.5–5.1)
PROT SERPL-MCNC: 5.8 G/DL (ref 6.4–8.2)
RBC # BLD AUTO: 4.23 M/UL (ref 3.8–5.2)
SODIUM SERPL-SCNC: 133 MMOL/L (ref 136–145)
WBC # BLD AUTO: 11.4 K/UL (ref 3.6–11)

## 2024-11-07 PROCEDURE — 97161 PT EVAL LOW COMPLEX 20 MIN: CPT

## 2024-11-07 PROCEDURE — 97530 THERAPEUTIC ACTIVITIES: CPT

## 2024-11-07 PROCEDURE — 85025 COMPLETE CBC W/AUTO DIFF WBC: CPT

## 2024-11-07 PROCEDURE — 97165 OT EVAL LOW COMPLEX 30 MIN: CPT

## 2024-11-07 PROCEDURE — 2580000003 HC RX 258

## 2024-11-07 PROCEDURE — 83735 ASSAY OF MAGNESIUM: CPT

## 2024-11-07 PROCEDURE — 99232 SBSQ HOSP IP/OBS MODERATE 35: CPT | Performed by: INTERNAL MEDICINE

## 2024-11-07 PROCEDURE — 6370000000 HC RX 637 (ALT 250 FOR IP): Performed by: FAMILY MEDICINE

## 2024-11-07 PROCEDURE — 97535 SELF CARE MNGMENT TRAINING: CPT

## 2024-11-07 PROCEDURE — 94640 AIRWAY INHALATION TREATMENT: CPT

## 2024-11-07 PROCEDURE — 6370000000 HC RX 637 (ALT 250 FOR IP)

## 2024-11-07 PROCEDURE — 2700000000 HC OXYGEN THERAPY PER DAY

## 2024-11-07 PROCEDURE — 2060000000 HC ICU INTERMEDIATE R&B

## 2024-11-07 PROCEDURE — 94761 N-INVAS EAR/PLS OXIMETRY MLT: CPT

## 2024-11-07 PROCEDURE — 2000000000 HC ICU R&B

## 2024-11-07 PROCEDURE — 6360000002 HC RX W HCPCS: Performed by: NURSE PRACTITIONER

## 2024-11-07 PROCEDURE — 36415 COLL VENOUS BLD VENIPUNCTURE: CPT

## 2024-11-07 PROCEDURE — 97116 GAIT TRAINING THERAPY: CPT

## 2024-11-07 PROCEDURE — 2580000003 HC RX 258: Performed by: NURSE PRACTITIONER

## 2024-11-07 PROCEDURE — 6360000002 HC RX W HCPCS

## 2024-11-07 PROCEDURE — 80053 COMPREHEN METABOLIC PANEL: CPT

## 2024-11-07 PROCEDURE — APPSS30 APP SPLIT SHARED TIME 16-30 MINUTES: Performed by: NURSE PRACTITIONER

## 2024-11-07 PROCEDURE — 94660 CPAP INITIATION&MGMT: CPT

## 2024-11-07 RX ORDER — IPRATROPIUM BROMIDE AND ALBUTEROL SULFATE 2.5; .5 MG/3ML; MG/3ML
1 SOLUTION RESPIRATORY (INHALATION)
Status: DISCONTINUED | OUTPATIENT
Start: 2024-11-07 | End: 2024-11-09 | Stop reason: HOSPADM

## 2024-11-07 RX ADMIN — ENOXAPARIN SODIUM 40 MG: 100 INJECTION SUBCUTANEOUS at 08:30

## 2024-11-07 RX ADMIN — AMLODIPINE BESYLATE 10 MG: 5 TABLET ORAL at 08:24

## 2024-11-07 RX ADMIN — SODIUM CHLORIDE, PRESERVATIVE FREE 10 ML: 5 INJECTION INTRAVENOUS at 03:05

## 2024-11-07 RX ADMIN — FUROSEMIDE 20 MG: 10 INJECTION, SOLUTION INTRAMUSCULAR; INTRAVENOUS at 08:25

## 2024-11-07 RX ADMIN — LEVOTHYROXINE SODIUM 50 MCG: 0.05 TABLET ORAL at 06:54

## 2024-11-07 RX ADMIN — METHYLPREDNISOLONE SODIUM SUCCINATE 60 MG: 125 INJECTION, POWDER, LYOPHILIZED, FOR SOLUTION INTRAMUSCULAR; INTRAVENOUS at 03:04

## 2024-11-07 RX ADMIN — AZITHROMYCIN MONOHYDRATE 500 MG: 500 INJECTION, POWDER, LYOPHILIZED, FOR SOLUTION INTRAVENOUS at 03:26

## 2024-11-07 RX ADMIN — METHYLPREDNISOLONE SODIUM SUCCINATE 60 MG: 125 INJECTION, POWDER, LYOPHILIZED, FOR SOLUTION INTRAMUSCULAR; INTRAVENOUS at 14:13

## 2024-11-07 RX ADMIN — SODIUM CHLORIDE, PRESERVATIVE FREE 10 ML: 5 INJECTION INTRAVENOUS at 08:25

## 2024-11-07 RX ADMIN — FUROSEMIDE 20 MG: 10 INJECTION, SOLUTION INTRAMUSCULAR; INTRAVENOUS at 17:29

## 2024-11-07 RX ADMIN — ATORVASTATIN CALCIUM 20 MG: 20 TABLET, FILM COATED ORAL at 20:40

## 2024-11-07 RX ADMIN — IPRATROPIUM BROMIDE AND ALBUTEROL SULFATE 1 DOSE: 2.5; .5 SOLUTION RESPIRATORY (INHALATION) at 07:04

## 2024-11-07 RX ADMIN — BUDESONIDE INHALATION 500 MCG: 0.5 SUSPENSION RESPIRATORY (INHALATION) at 19:27

## 2024-11-07 RX ADMIN — IPRATROPIUM BROMIDE AND ALBUTEROL SULFATE 1 DOSE: 2.5; .5 SOLUTION RESPIRATORY (INHALATION) at 13:55

## 2024-11-07 RX ADMIN — OXYCODONE HYDROCHLORIDE AND ACETAMINOPHEN 1000 MG: 500 TABLET ORAL at 08:24

## 2024-11-07 RX ADMIN — ARFORMOTEROL TARTRATE 15 MCG: 15 SOLUTION RESPIRATORY (INHALATION) at 19:27

## 2024-11-07 RX ADMIN — BUDESONIDE INHALATION 500 MCG: 0.5 SUSPENSION RESPIRATORY (INHALATION) at 07:09

## 2024-11-07 RX ADMIN — CHOLECALCIFEROL TAB 125 MCG (5000 UNIT) 5000 UNITS: 125 TAB at 08:24

## 2024-11-07 RX ADMIN — ARFORMOTEROL TARTRATE 15 MCG: 15 SOLUTION RESPIRATORY (INHALATION) at 07:09

## 2024-11-07 RX ADMIN — WATER 2000 MG: 1 INJECTION INTRAMUSCULAR; INTRAVENOUS; SUBCUTANEOUS at 03:07

## 2024-11-07 RX ADMIN — LISINOPRIL 40 MG: 20 TABLET ORAL at 08:24

## 2024-11-07 RX ADMIN — IPRATROPIUM BROMIDE AND ALBUTEROL SULFATE 1 DOSE: 2.5; .5 SOLUTION RESPIRATORY (INHALATION) at 19:27

## 2024-11-07 ASSESSMENT — PAIN SCALES - GENERAL
PAINLEVEL_OUTOF10: 0

## 2024-11-07 NOTE — PROGRESS NOTES
[facility] CARDIOLOGY                    Cardiology Care Note     []Initial Encounter     [x]Follow-up    Patient Name: Brittney Alston - :1950 - MRN:[id number]  Primary Cardiologist:   Consulting Cardiologist: Porfirio Logan MD     Reason for encounter: L BBB, nausea, hypoxia     HPI:       Brittney Alston is a 74 y.o. female with PMH of HTN, HLD, hypothyroidism, osteoporosis  who presents to the ER with about one week of SOB and nausea. Has also had increasing leg swelling over 2 months.   SPO2 64 % in ED      Denies CP, vomiting, changes to bowel habits, fever/chills. Denies h/o lung or heart disease.      add to hx : daytime somnolence, talking in her sleep , decrease activity level leg swelling for many months. No cardiac hx    Subjective:      Brittney Alston reports breathing improved some.      Assessment and Plan     1 Acute hypoxic resp failure:   - mixed picture (CHF, NORA, emphysema)  - pBNP 494  - chest CT with no PE  - Bi Pap overnight, now on NC 3L   - IV diuresis for another day   - I/O  - TTE w/ EF 55-60%, normal RV function, normal RVSP   - nebs  - IV steroids     2 Hyponatremia  - improving, up to 133    3 Incidental LBBB  - will need ischemic eval stress vs cath, likely stress given normal EF   - testing tomorrow vs outpatient     4 HTN  - BP improved since admission, now elevated   - resume home meds     5 Hypothyroidism    6. Obesity  Body mass index is 34.96 kg/m².        ____________________________________________________________    Cardiac testing  24    ECHO (TTE) COMPLETE (PRN CONTRAST/BUBBLE/STRAIN/3D) 2024  3:25 PM (Final)    Interpretation Summary    Left Ventricle: Normal left ventricular systolic function with a visually estimated EF of 55 - 60%. Left ventricle size is normal. Normal wall thickness. Normal wall motion. Abnormal diastolic function.    Tricuspid Valve: Normal RVSP.    Image quality is suboptimal. Contrast used: Definity. Procedure  3D post processing was performed.  CT  dose reduction was achieved through the use of a standardized protocol tailored  for this examination and automatic exposure control for dose modulation.    FINDINGS: This is a good quality study for the evaluation of pulmonary embolism  to the proximal first subsegmental arterial level. There is no pulmonary  embolism to this level. Pulmonic trunk normal in caliber.      MEDIASTINUM: No mass or lymphadenopathy.  ISMAEL: Mildly prominent right hilar lymph node measuring up to 1.0 cm short axis  likely reactive.  THORACIC AORTA: No aneurysm.  HEART: Normal in size.  ESOPHAGUS: No wall thickening or dilatation.  TRACHEA/BRONCHI: Scattered mucous plugging and bronchial wall thickening.  PLEURA: No pleural effusion or pneumothorax.  LUNGS: Mosaic attenuation of the lungs. Emphysematous change of the lungs.  Groundglass and consolidative opacities with a basilar predominance.  UPPER ABDOMEN: Partially imaged. No acute pathology.  BONES: No aggressive bone lesion or fracture.    Impression  No evidence of pulmonary embolism.    Nonspecific findings of mosaic attenuation of the lungs but in the setting of  scattered mucus plugging and bronchial wall thickening suggestive of small  airway disease.    Basilar opacities as detailed above may represent atelectasis although  infectious/inflammatory process could appear similarly and would need to be  excluded clinically.    Emphysema changes lungs.      Electronically signed by Nawaf Romero      Lab Results   Component Value Date    WBC 11.4 (H) 11/07/2024    HGB 12.8 11/07/2024    HCT 39.7 11/07/2024    MCV 93.9 11/07/2024     11/07/2024       No results for input(s): \"CHOL\", \"HDLC\", \"LDLC\", \"HBA1C\" in the last 72 hours.    Invalid input(s): \"TGL\"    Lab Results   Component Value Date/Time     11/07/2024 03:17 AM    K 5.0 11/07/2024 03:17 AM    CL 93 11/07/2024 03:17 AM    CO2 36 11/07/2024 03:17 AM    BUN 19 11/07/2024  PRN, Noman Jacobs MD    enoxaparin (LOVENOX) injection 40 mg, 40 mg, SubCUTAneous, Daily, Noman Jacobs MD, 40 mg at 11/07/24 0830    ondansetron (ZOFRAN-ODT) disintegrating tablet 4 mg, 4 mg, Oral, Q8H PRN **OR** ondansetron (ZOFRAN) injection 4 mg, 4 mg, IntraVENous, Q6H PRN, Noman Jacobs MD    polyethylene glycol (GLYCOLAX) packet 17 g, 17 g, Oral, Daily PRN, Noman Jacobs MD    acetaminophen (TYLENOL) tablet 650 mg, 650 mg, Oral, Q6H PRN **OR** acetaminophen (TYLENOL) suppository 650 mg, 650 mg, Rectal, Q6H PRN, Noman Jacobs MD    furosemide (LASIX) injection 20 mg, 20 mg, IntraVENous, BID, Noman Jacobs MD, 20 mg at 11/07/24 0825    ipratropium 0.5 mg-albuterol 2.5 mg (DUONEB) nebulizer solution 1 Dose, 1 Dose, Inhalation, Q4H WA RT, Noman Jacobs MD, 1 Dose at 11/07/24 0704    arformoterol tartrate (BROVANA) nebulizer solution 15 mcg, 15 mcg, Nebulization, BID RT, Noman Jacobs MD, 15 mcg at 11/07/24 0709    budesonide (PULMICORT) nebulizer suspension 500 mcg, 0.5 mg, Nebulization, BID RT, Noman Jacobs MD, 500 mcg at 11/07/24 0709    RANJIT Flowers - NP    [facility] Cardiology  Call center: (P) [phone]  (F) [phone]      CC:Chepe Larose MD

## 2024-11-07 NOTE — PLAN OF CARE
Problem: Discharge Planning  Goal: Discharge to home or other facility with appropriate resources  Outcome: Progressing     Problem: Pain  Goal: Verbalizes/displays adequate comfort level or baseline comfort level  Outcome: Progressing     Problem: ABCDS Injury Assessment  Goal: Absence of physical injury  Outcome: Progressing     Problem: Safety - Adult  Goal: Free from fall injury  Outcome: Progressing     Problem: Respiratory - Adult  Goal: Achieves optimal ventilation and oxygenation  11/7/2024 0930 by Guillermina Carlton RN  Outcome: Progressing  11/7/2024 0919 by Jessie Ramey, RT  Outcome: Progressing  Flowsheets (Taken 11/6/2024 1940 by Sola Schumacher RCP)  Achieves optimal ventilation and oxygenation:   Respiratory therapy support as indicated   Assess and instruct to report shortness of breath or any respiratory difficulty   Oxygen supplementation based on oxygen saturation or arterial blood gases   Assess for changes in respiratory status   Assess for changes in mentation and behavior   Position to facilitate oxygenation and minimize respiratory effort     Problem: Chronic Conditions and Co-morbidities  Goal: Patient's chronic conditions and co-morbidity symptoms are monitored and maintained or improved  Outcome: Progressing     Problem: Skin/Tissue Integrity  Goal: Absence of new skin breakdown  Description: 1.  Monitor for areas of redness and/or skin breakdown  2.  Assess vascular access sites hourly  3.  Every 4-6 hours minimum:  Change oxygen saturation probe site  4.  Every 4-6 hours:  If on nasal continuous positive airway pressure, respiratory therapy assess nares and determine need for appliance change or resting period.  Outcome: Progressing

## 2024-11-07 NOTE — PROGRESS NOTES
[address] [id number]   Office [phone]  Fax [phone]          Subjective / Objective     Subjective  Overnight Events: None   Patient seen and examined at bedside. Sleeping comfortably with Bipap in place. Denies CP, N/V, dysuria.     Respiratory:   BiPAP while sleeping; 2L NC while awake  Vitals:    11/08/24 0703   BP:    Pulse: 99   Resp: 20   Temp:    SpO2: 93%     Physical Examination:   General appearance - alert, well appearing, and in no distress  Chest - slight end expiratory wheezing in bases, rhonchorous, no crackles.  Heart - normal rate, regular rhythm, normal S1, S2, no murmurs, rubs, clicks or gallops,   Abdomen - soft, nontender, nondistended, no masses or organomegaly  Neurological - alert, oriented, normal speech, no focal findings  Skin - warm, dry. No notable rashes  Extremities - peripheral pulses normal, +2 pedal edema bilaterally, no clubbing or cyanosis  Psychiatric - normal speech and thought processes    I/O:  11/07 0701 - 11/08 0700  In: 1599.5 [P.O.:920]  Out: 2200 [Urine:2200]    Inpatient Medications    Current Facility-Administered Medications   Medication Dose Route Frequency    predniSONE (DELTASONE) tablet 60 mg  60 mg Oral BID    furosemide (LASIX) tablet 20 mg  20 mg Oral Daily    ipratropium 0.5 mg-albuterol 2.5 mg (DUONEB) nebulizer solution 1 Dose  1 Dose Inhalation Q6H WA RT    cefTRIAXone (ROCEPHIN) 2,000 mg in sterile water 20 mL IV syringe  2,000 mg IntraVENous Q24H    amLODIPine (NORVASC) tablet 10 mg  10 mg Oral Daily    ascorbic acid (VITAMIN C) tablet 1,000 mg  1,000 mg Oral Daily    atorvastatin (LIPITOR) tablet 20 mg  20 mg Oral Nightly    levothyroxine (SYNTHROID) tablet 50 mcg  50 mcg Oral Daily    lisinopril (PRINIVIL;ZESTRIL) tablet 40 mg  40 mg Oral Daily    metoprolol succinate (TOPROL XL) extended release tablet 100 mg  100 mg Oral Daily    vitamin D3 (CHOLECALCIFEROL) tablet 5,000 Units  5,000 Units Oral Daily    [Held by  provider] oxyCODONE (ROXICODONE) immediate release tablet 5 mg  5 mg Oral Q6H PRN    sodium chloride flush 0.9 % injection 5-40 mL  5-40 mL IntraVENous 2 times per day    sodium chloride flush 0.9 % injection 5-40 mL  5-40 mL IntraVENous PRN    0.9 % sodium chloride infusion   IntraVENous PRN    enoxaparin (LOVENOX) injection 40 mg  40 mg SubCUTAneous Daily    ondansetron (ZOFRAN-ODT) disintegrating tablet 4 mg  4 mg Oral Q8H PRN    Or    ondansetron (ZOFRAN) injection 4 mg  4 mg IntraVENous Q6H PRN    polyethylene glycol (GLYCOLAX) packet 17 g  17 g Oral Daily PRN    acetaminophen (TYLENOL) tablet 650 mg  650 mg Oral Q6H PRN    Or    acetaminophen (TYLENOL) suppository 650 mg  650 mg Rectal Q6H PRN    arformoterol tartrate (BROVANA) nebulizer solution 15 mcg  15 mcg Nebulization BID RT    budesonide (PULMICORT) nebulizer suspension 500 mcg  0.5 mg Nebulization BID RT     Allergies  Allergies   Allergen Reactions    Pholcodine      Other Reaction(s): facial swelling    Codeine Swelling     Other Reaction(s): SWELLING; DIFFICULTY BREATHING    Seasonal      Other Reaction(s): FALL-NASAL CONGESTION     CBC:  Recent Labs     11/06/24  0126 11/07/24 0317 11/08/24  0528   WBC 6.2 11.4* 11.4*   HGB 13.8 12.8 12.2    280 298     Metabolic Panel:  Recent Labs     11/05/24  1319 11/05/24  1444 11/06/24  0126 11/06/24  0313 11/06/24  1630 11/07/24  0317 11/08/24  0528   NA  --    < > 123*   < > 127* 133* 133*   K  --    < > Hemolyzed, Recollection Recommended   < > 4.7 5.0 4.8   CL  --    < > 84*   < > 86* 93* 94*   CO2  --    < > 37*   < > 37* 36* 34*   BUN  --    < > 14   < > 22* 19 16   MG  --    < > Hemolyzed, Recollection Recommended  --   --  2.4 2.4   ALT  --   --  26  --   --  18 18   INR 1.0  --   --   --   --   --   --     < > = values in this interval not displayed.     Imaging/procedures:   11/05/24    ECHO (TTE) COMPLETE (PRN CONTRAST/BUBBLE/STRAIN/3D) 11/06/2024  3:25 PM (Final)    Interpretation  amlodipine, toprol  - continue home lipitor     Hypothyroidism: continue home levothyroxine 50mcg qam. TSH 1.82 here.   - continue home med     Osteoporosis: on home fosamax weekly.  - hold home med     Obesity: Body mass index is 36.85 kg/m².  - Encourage lifestyle modifications and further follow up outpatient     FEN/GI - Regular.   Activity - Out of bed with assistance  DVT prophylaxis - Lovenox  GI prophylaxis - Not indicated at this time  Fall prophylaxis - Fall precautions ordered.  Disposition - Plan to d/c to Home, PT/OT no needs  Code Status - Full, discussed with patient / caregivers.  Point of Contact Andrew Alston (Spouse)  [phone]     Patient discussed with Elsi Tejada MD Grace E Le, MD  [facility] Resident       For Billing    Chief Complaint   Patient presents with    Shortness of Breath

## 2024-11-07 NOTE — PROGRESS NOTES
OCCUPATIONAL THERAPY EVALUATION/DISCHARGE  Patient: Brittney Alston (74 y.o. female)  Date: 11/7/2024  Primary Diagnosis: Hyponatremia [E87.1]  Acute respiratory failure with hypoxia and hypercarbia [J96.01, J96.02]         Precautions: fall    ASSESSMENT :  Patient presents at functional baseline except for impaired respiratory tolerance.  She demonstrated WFL AROM/ strength/ coordination for ADLs.  She has mildly impaired balance and furniture walks at home.  No recent falls.  At home she was on room air.  Today she desat to 84% with activity and required titration to 4L/min NC to recover, but was asymptomatic.  She was very eager to mobilize OOB and ambulated to/ from bathroom for toileting/ standing grooming at supervision level.  She was receptive to education on breathing techniques/ energy conservation and was advised to obtain a pulse oximeter for home.  Patient's  present and supportive.  Recommend return home at d/c with no further OT needs.    Functional Outcome Measure:  The patient scored 0% on the AM-PAC ADL outcome measure which is indicative of no ADL impairment.      Further skilled acute occupational therapy is not indicated at this time.     PLAN :    Recommendation for discharge: (in order for the patient to meet his/her long term goals):   No skilled occupational therapy       SUBJECTIVE:   Patient stated, “I feel fine.”    OBJECTIVE DATA SUMMARY:     Past Medical History:   Diagnosis Date    Hyperlipidemia     Hypertension     Hypothyroidism     Obesity      Past Surgical History:   Procedure Laterality Date    CHOLECYSTECTOMY      FOOT SURGERY Right     TOTAL HIP ARTHROPLASTY         Prior Level of Function/Environment/Context:   , ADL Assistance: Independent,  ,  ,  ,  ,  , Homemaking Assistance: Independent, Ambulation Assistance: Independent, Transfer Assistance: Independent, Active : Yes     Expanded or extensive additional review of patient history:   Social/Functional  nurse         Thank you for this referral.  Sky Iqbal OT  Minutes: 34    Occupational Therapy Evaluation Charge Determination   History Examination Decision-Making   LOW Complexity : Brief history review  LOW Complexity: 1-3 Performance deficits relating to physical, cognitive, or psychosocial skills that result in activity limitations and/or participation restrictions MEDIUM Complexity: Patient may present with comorbidities that affect occupational performance. Minimal to moderate modifications of tasks or assist (eg. physical or verbal) with assist is necessary to enable pt to complete eval   Based on the above components, the patient evaluation is determined to be of the following complexity level: Low

## 2024-11-07 NOTE — PROGRESS NOTES
[id number] [id number]   Office [phone]  Fax [phone]          Subjective / Objective     Subjective  Overnight Events: None  Patient seen and examined at bedside. Reports feeling well this AM. Denies CP, N/V, dysuria.     Respiratory:   BiPAP while sleeping; 2L NC while awake  Vitals:    11/07/24 0709   BP:    Pulse: 66   Resp: 12   Temp:    SpO2: 98%     Physical Examination:   General appearance - alert, well appearing, and in no distress  Chest - slight end expiratory wheezing, rhonchorous, no crackles.  Heart - normal rate, regular rhythm, normal S1, S2, no murmurs, rubs, clicks or gallops,   Abdomen - soft, nontender, nondistended, no masses or organomegaly  Neurological - alert, oriented, normal speech, no focal findings  Skin - warm, dry. No notable rashes  Extremities - peripheral pulses normal, +2 pedal edema bilaterally, no clubbing or cyanosis  Psychiatric - normal speech and thought processes    I/O:  11/06 0701 - 11/07 0700  In: 330 [P.O.:300; I.V.:30]  Out: 2100 [Urine:2100]  Inpatient Medications    Current Facility-Administered Medications   Medication Dose Route Frequency    methylPREDNISolone sodium succ (SOLU-MEDROL) 60 mg in sterile water 0.96 mL injection  60 mg IntraVENous Q12H    cefTRIAXone (ROCEPHIN) 2,000 mg in sterile water 20 mL IV syringe  2,000 mg IntraVENous Q24H    azithromycin (ZITHROMAX) 500 mg in sodium chloride 0.9 % 250 mL IVPB ([id number])  500 mg IntraVENous Q24H    [Held by provider] amLODIPine (NORVASC) tablet 10 mg  10 mg Oral Daily    ascorbic acid (VITAMIN C) tablet 1,000 mg  1,000 mg Oral Daily    atorvastatin (LIPITOR) tablet 20 mg  20 mg Oral Nightly    levothyroxine (SYNTHROID) tablet 50 mcg  50 mcg Oral Daily    [Held by provider] lisinopril (PRINIVIL;ZESTRIL) tablet 40 mg  40 mg Oral Daily    [Held by provider] metoprolol succinate (TOPROL XL) extended release tablet 100 mg  100 mg Oral Daily    vitamin D3 (CHOLECALCIFEROL) tablet  5,000 Units  5,000 Units Oral Daily    [Held by provider] oxyCODONE (ROXICODONE) immediate release tablet 5 mg  5 mg Oral Q6H PRN    sodium chloride flush 0.9 % injection 5-40 mL  5-40 mL IntraVENous 2 times per day    sodium chloride flush 0.9 % injection 5-40 mL  5-40 mL IntraVENous PRN    0.9 % sodium chloride infusion   IntraVENous PRN    enoxaparin (LOVENOX) injection 40 mg  40 mg SubCUTAneous Daily    ondansetron (ZOFRAN-ODT) disintegrating tablet 4 mg  4 mg Oral Q8H PRN    Or    ondansetron (ZOFRAN) injection 4 mg  4 mg IntraVENous Q6H PRN    polyethylene glycol (GLYCOLAX) packet 17 g  17 g Oral Daily PRN    acetaminophen (TYLENOL) tablet 650 mg  650 mg Oral Q6H PRN    Or    acetaminophen (TYLENOL) suppository 650 mg  650 mg Rectal Q6H PRN    furosemide (LASIX) injection 20 mg  20 mg IntraVENous BID    ipratropium 0.5 mg-albuterol 2.5 mg (DUONEB) nebulizer solution 1 Dose  1 Dose Inhalation Q4H WA RT    arformoterol tartrate (BROVANA) nebulizer solution 15 mcg  15 mcg Nebulization BID RT    budesonide (PULMICORT) nebulizer suspension 500 mcg  0.5 mg Nebulization BID RT     Allergies  Allergies   Allergen Reactions    Pholcodine      Other Reaction(s): facial swelling    Codeine Swelling     Other Reaction(s): SWELLING; DIFFICULTY BREATHING    Seasonal      Other Reaction(s): FALL-NASAL CONGESTION     CBC:  Recent Labs     11/05/24  1318 11/06/24  0126 11/07/24 0317   WBC 13.5* 6.2 11.4*   HGB 15.6 13.8 12.8    362 280     Metabolic Panel:  Recent Labs     11/05/24  1318 11/05/24  1319 11/05/24  1444 11/05/24  2112 11/06/24  0126 11/06/24  0313 11/06/24  0635 11/06/24  1630 11/07/24  0317   *  --    < >  --  123*   < > 124* 127* 133*   K Hemolyzed, Recollection Recommended  --    < >  --  Hemolyzed, Recollection Recommended   < > 5.3* 4.7 5.0   CL 82*  --    < >  --  84*   < > 85* 86* 93*   CO2 36*  --    < >  --  37*   < > 35* 37* 36*   BUN 11  --    < >  --  14   < > 16 22* 19   MG Hemolyzed,

## 2024-11-07 NOTE — PLAN OF CARE
Problem: Physical Therapy - Adult  Goal: By Discharge: Performs mobility at highest level of function for planned discharge setting.  See evaluation for individualized goals.  Description: FUNCTIONAL STATUS PRIOR TO ADMISSION: Patient was independent and active without use of DME.    HOME SUPPORT PRIOR TO ADMISSION: The patient lived with spouse but did not require assistance.    Physical Therapy Goals  Initiated 11/7/2024  1.  Patient will move from supine to sit and sit to supine, scoot up and down, and roll side to side in bed with independence within 7 day(s).    2.  Patient will perform sit to stand with modified independence within 7 day(s).  3.  Patient will transfer from bed to chair and chair to bed with modified independence using the least restrictive device within 7 day(s).  4.  Patient will ambulate with modified independence for 200 feet with the least restrictive device within 7 day(s).   5.  Patient will ascend/descend 4 stairs with  handrail(s) with modified independence within 7 day(s).   Outcome: Progressing   PHYSICAL THERAPY EVALUATION    Patient: Brittney Alston (74 y.o. female)  Date: 11/7/2024  Primary Diagnosis: Hyponatremia [E87.1]  Acute respiratory failure with hypoxia and hypercarbia [J96.01, J96.02]       Precautions:                        ASSESSMENT :   DEFICITS/IMPAIRMENTS:   The patient is limited by decreased functional mobility, independence in ADLs, high-level IADLs, activity tolerance, endurance, safety awareness     Based on the impairments listed above patient presents with generalized weakness and debility. Patient supine on bed when received spouse at bedside agreed with all goals set for the patient. Rolled on the edge of bed, supine to sit, sit to stand SBA. Ambulate without assistive device in the room and out on the ICU hallway occasionally reach and hold on the wall rails. No loss of balance steady sitting and standing. However patient desaturate on room air with             Gait  Gait Training: Yes  Overall Level of Assistance: Stand-by assistance  Distance (ft): 100 Feet  Assistive Device: Gait belt  Interventions: Safety awareness training  Speed/Helen: Pace decreased (< 100 feet/min)  Step Length: Right shortened;Left shortened  Gait Abnormalities: Path deviations;Step to gait                         Documentation for home O2:     ROOM AIR   (2    ) LITERS OF O2 AT REST   O2 SATS  95% HR     ROOM AIR WITH ACTIVITY 02 SATS  84% HR     (4    ) LITERS OF O2 WITH ACTIVITY O2 SATS  93% HR     (3    )LITERS OF 02 PATIENT LEFT COMFORTABLY  SITTING/SUPINE 02 SATS  95% HR                                                                                                                                                                                                                                          [facility] AM-PAC®      Basic Mobility Inpatient Short Form (6-Clicks) Version 2  How much HELP from another person do you currently need... (If the patient hasn't done an activity recently, how much help from another person do you think they would need if they tried?) Total A Lot A Little None   1.  Turning from your back to your side while in a flat bed without using bedrails? []  1 []  2 []  3  [x]  4   2.  Moving from lying on your back to sitting on the side of a flat bed without using bedrails? []  1 []  2 []  3  [x]  4   3.  Moving to and from a bed to a chair (including a wheelchair)? []  1 []  2 []  3  [x]  4   4. Standing up from a chair using your arms (e.g. wheelchair or bedside chair)? []  1 []  2 []  3  [x]  4   5.  Walking in hospital room? []  1 []  2 []  3  [x]  4   6.  Climbing 3-5 steps with a railing? []  1 []  2 []  3  []  4     Raw Score: 20/24                            Cutoff score <=171,2,3 had higher odds of discharging home with home health or need of SNF/IPR.    1. Roxie Zambrano, Erica White, Sly Irby, Laurita Serrano, Nigel CLARKE  Naveen Calderón.  Validity of the AM-PAC “6-Clicks” Inpatient Daily Activity and Basic Mobility Short Forms. Physical Therapy Mar 2014, 94 (2) 379-391; DOI: 10.2522/ptj.76586423  2. Ezra PATTON, Candace LOCKE, Casey LOCKE, Marguerite LOCKE. Association of AM-PAC \"6-Clicks\" Basic Mobility and Daily Activity Scores With Discharge Destination. Phys Ther. 2021 4;101(4):slfj690. doi: 10.1093/ptj/wrwu806. PMID: 56715614.  3. Harpal J, Awa D, Gabriella S, Nisreen K, Sriram S. Activity Measure for Post-Acute Care \"6-Clicks\" Basic Mobility Scores Predict Discharge Destination After Acute Care Hospitalization in Select Patient Groups: A Retrospective, Observational Study. Arch Rehabil Res Clin Transl. 2022 16;4(3):567387. doi: 10.1016/j.arrct..988255. PMID: 89353359; PMCID: ESK9752598.  4. Juan Manuel MONTOYA, Zoe S, Margaux W, Neetu HENDERSON. AM-PAC Short Forms Manual 4.0. Revised 2020.                                                                                                                                                                                                                              Pain Ratin/10   Pain Intervention(s):   nursing notified and addressing    Activity Tolerance:   Good    After treatment:   Patient left in no apparent distress sitting up in chair, Call bell within reach, Bed/ chair alarm activated, Caregiver / family present, and Heels elevated for pressure relief    COMMUNICATION/EDUCATION:   The patient's plan of care was discussed with: physical therapist, occupational therapist, registered nurse, , and respiratory therapist    Patient Education  Education Given To: Patient;Family;Caregiver  Education Provided: Role of Therapy;Transfer Training;Plan of Care;Energy Conservation;Home Exercise Program;IADL Safety;Fall Prevention Strategies;Precautions;Orientation;ADL Adaptive Strategies;Family Education  Education Method: Verbal  Barriers to Learning: None  Education Outcome: Verbalized

## 2024-11-07 NOTE — CARE COORDINATION
2:02 PM  CM noted PT/OT evaluations complete- no skilled PT/OT needs. CM continuing to follow for home O2 needs.     11/7/24  8:25 AM    Care Management Progress Note    Reason for Admission:   Hyponatremia [E87.1]  Acute respiratory failure with hypoxia and hypercarbia [J96.01, J96.02]         Patient Admission Status: Inpatient  RUR: 10%  Hospitalization in the last 30 days (Readmission):  No        Transition of care plan:  Ongoing medical management-weaning O2  Discharge plan: Pending PT/OT evals- following for recommendations  Discharge plan communicated with patient and/or discharge caregiver: Yes    Date 1st IMM letter given: 11/6/24  Outpatient follow-up.  Transport at discharge: Family    CM following for dc needs. Noted patient's O2 needs are decreasing- Bipap while sleeping and 2L NC while awake. CM following for home O2 needs. PT/OT evals pending.    An Wadsworth MSW  [facility]      Available via [employer]

## 2024-11-08 ENCOUNTER — APPOINTMENT (OUTPATIENT)
Facility: HOSPITAL | Age: 74
End: 2024-11-08
Payer: MEDICARE

## 2024-11-08 PROBLEM — I44.7 LBBB (LEFT BUNDLE BRANCH BLOCK): Status: ACTIVE | Noted: 2024-11-08

## 2024-11-08 PROBLEM — I10 PRIMARY HYPERTENSION: Status: ACTIVE | Noted: 2024-11-08

## 2024-11-08 PROBLEM — E03.9 ACQUIRED HYPOTHYROIDISM: Status: ACTIVE | Noted: 2024-11-08

## 2024-11-08 PROBLEM — E87.1 HYPONATREMIA: Status: ACTIVE | Noted: 2024-11-08

## 2024-11-08 LAB
ALBUMIN SERPL-MCNC: 3.1 G/DL (ref 3.5–5)
ALBUMIN/GLOB SERPL: 1.2 (ref 1.1–2.2)
ALP SERPL-CCNC: 70 U/L (ref 45–117)
ALT SERPL-CCNC: 18 U/L (ref 12–78)
ANION GAP SERPL CALC-SCNC: 5 MMOL/L (ref 2–12)
AST SERPL-CCNC: 14 U/L (ref 15–37)
BASOPHILS # BLD: 0 K/UL (ref 0–0.1)
BASOPHILS NFR BLD: 0 % (ref 0–1)
BILIRUB SERPL-MCNC: 0.7 MG/DL (ref 0.2–1)
BUN SERPL-MCNC: 16 MG/DL (ref 6–20)
BUN/CREAT SERPL: 23 (ref 12–20)
CALCIUM SERPL-MCNC: 8 MG/DL (ref 8.5–10.1)
CHLORIDE SERPL-SCNC: 94 MMOL/L (ref 97–108)
CO2 SERPL-SCNC: 34 MMOL/L (ref 21–32)
CREAT SERPL-MCNC: 0.69 MG/DL (ref 0.55–1.02)
DIFFERENTIAL METHOD BLD: ABNORMAL
ECHO BSA: 1.99 M2
EOSINOPHIL # BLD: 0 K/UL (ref 0–0.4)
EOSINOPHIL NFR BLD: 0 % (ref 0–7)
ERYTHROCYTE [DISTWIDTH] IN BLOOD BY AUTOMATED COUNT: 14.2 % (ref 11.5–14.5)
FLUID CULTURE: NORMAL
GLOBULIN SER CALC-MCNC: 2.5 G/DL (ref 2–4)
GLUCOSE BLD STRIP.AUTO-MCNC: 123 MG/DL (ref 65–117)
GLUCOSE SERPL-MCNC: 160 MG/DL (ref 65–100)
HCT VFR BLD AUTO: 38.9 % (ref 35–47)
HGB BLD-MCNC: 12.2 G/DL (ref 11.5–16)
IMM GRANULOCYTES # BLD AUTO: 0.1 K/UL (ref 0–0.04)
IMM GRANULOCYTES NFR BLD AUTO: 1 % (ref 0–0.5)
L PNEUMO1 AG UR QL IA: NEGATIVE
LYMPHOCYTES # BLD: 1 K/UL (ref 0.8–3.5)
LYMPHOCYTES NFR BLD: 9 % (ref 12–49)
Lab: NORMAL
MAGNESIUM SERPL-MCNC: 2.4 MG/DL (ref 1.6–2.4)
MCH RBC QN AUTO: 29.6 PG (ref 26–34)
MCHC RBC AUTO-ENTMCNC: 31.4 G/DL (ref 30–36.5)
MCV RBC AUTO: 94.4 FL (ref 80–99)
MONOCYTES # BLD: 0.4 K/UL (ref 0–1)
MONOCYTES NFR BLD: 4 % (ref 5–13)
NEUTS SEG # BLD: 9.9 K/UL (ref 1.8–8)
NEUTS SEG NFR BLD: 86 % (ref 32–75)
NRBC # BLD: 0 K/UL (ref 0–0.01)
NRBC BLD-RTO: 0 PER 100 WBC
NUC STRESS EJECTION FRACTION: 69 %
ORGANISM ID: NORMAL
PLATELET # BLD AUTO: 298 K/UL (ref 150–400)
PMV BLD AUTO: 10.1 FL (ref 8.9–12.9)
POTASSIUM SERPL-SCNC: 4.8 MMOL/L (ref 3.5–5.1)
PROT SERPL-MCNC: 5.6 G/DL (ref 6.4–8.2)
RBC # BLD AUTO: 4.12 M/UL (ref 3.8–5.2)
S PNEUM AG SPEC QL LA: NEGATIVE
SERVICE CMNT-IMP: ABNORMAL
SODIUM SERPL-SCNC: 133 MMOL/L (ref 136–145)
SPECIMEN SOURCE: NORMAL
SPECIMEN SOURCE: NORMAL
SPECIMEN: NORMAL
STRESS BASELINE DIAS BP: 69 MMHG
STRESS BASELINE HR: 60 BPM
STRESS BASELINE SYS BP: 154 MMHG
STRESS ESTIMATED WORKLOAD: 1 METS
STRESS PEAK DIAS BP: 69 MMHG
STRESS PEAK SYS BP: 154 MMHG
STRESS PERCENT HR ACHIEVED: 58 %
STRESS POST PEAK HR: 86 BPM
STRESS RATE PRESSURE PRODUCT: NORMAL BPM*MMHG
STRESS TARGET HR: 148 BPM
WBC # BLD AUTO: 11.4 K/UL (ref 3.6–11)

## 2024-11-08 PROCEDURE — 6370000000 HC RX 637 (ALT 250 FOR IP)

## 2024-11-08 PROCEDURE — 1100000000 HC RM PRIVATE

## 2024-11-08 PROCEDURE — 94660 CPAP INITIATION&MGMT: CPT

## 2024-11-08 PROCEDURE — 6360000002 HC RX W HCPCS

## 2024-11-08 PROCEDURE — 97116 GAIT TRAINING THERAPY: CPT

## 2024-11-08 PROCEDURE — 2580000003 HC RX 258

## 2024-11-08 PROCEDURE — 6360000002 HC RX W HCPCS: Performed by: NURSE PRACTITIONER

## 2024-11-08 PROCEDURE — 78452 HT MUSCLE IMAGE SPECT MULT: CPT | Performed by: SPECIALIST

## 2024-11-08 PROCEDURE — 85025 COMPLETE CBC W/AUTO DIFF WBC: CPT

## 2024-11-08 PROCEDURE — 93017 CV STRESS TEST TRACING ONLY: CPT

## 2024-11-08 PROCEDURE — 93018 CV STRESS TEST I&R ONLY: CPT | Performed by: SPECIALIST

## 2024-11-08 PROCEDURE — 97530 THERAPEUTIC ACTIVITIES: CPT

## 2024-11-08 PROCEDURE — 2700000000 HC OXYGEN THERAPY PER DAY

## 2024-11-08 PROCEDURE — 99233 SBSQ HOSP IP/OBS HIGH 50: CPT | Performed by: FAMILY MEDICINE

## 2024-11-08 PROCEDURE — 36415 COLL VENOUS BLD VENIPUNCTURE: CPT

## 2024-11-08 PROCEDURE — 83735 ASSAY OF MAGNESIUM: CPT

## 2024-11-08 PROCEDURE — 93016 CV STRESS TEST SUPVJ ONLY: CPT | Performed by: SPECIALIST

## 2024-11-08 PROCEDURE — 94761 N-INVAS EAR/PLS OXIMETRY MLT: CPT

## 2024-11-08 PROCEDURE — 78452 HT MUSCLE IMAGE SPECT MULT: CPT

## 2024-11-08 PROCEDURE — 3430000000 HC RX DIAGNOSTIC RADIOPHARMACEUTICAL: Performed by: NURSE PRACTITIONER

## 2024-11-08 PROCEDURE — A9500 TC99M SESTAMIBI: HCPCS | Performed by: NURSE PRACTITIONER

## 2024-11-08 PROCEDURE — 80053 COMPREHEN METABOLIC PANEL: CPT

## 2024-11-08 PROCEDURE — 94640 AIRWAY INHALATION TREATMENT: CPT

## 2024-11-08 PROCEDURE — 6370000000 HC RX 637 (ALT 250 FOR IP): Performed by: NURSE PRACTITIONER

## 2024-11-08 PROCEDURE — 6370000000 HC RX 637 (ALT 250 FOR IP): Performed by: FAMILY MEDICINE

## 2024-11-08 PROCEDURE — 2580000003 HC RX 258: Performed by: NURSE PRACTITIONER

## 2024-11-08 PROCEDURE — APPSS30 APP SPLIT SHARED TIME 16-30 MINUTES: Performed by: NURSE PRACTITIONER

## 2024-11-08 PROCEDURE — 82962 GLUCOSE BLOOD TEST: CPT

## 2024-11-08 PROCEDURE — 99232 SBSQ HOSP IP/OBS MODERATE 35: CPT | Performed by: INTERNAL MEDICINE

## 2024-11-08 RX ORDER — TETRAKIS(2-METHOXYISOBUTYLISOCYANIDE)COPPER(I) TETRAFLUOROBORATE 1 MG/ML
8 INJECTION, POWDER, LYOPHILIZED, FOR SOLUTION INTRAVENOUS ONCE
Status: COMPLETED | OUTPATIENT
Start: 2024-11-08 | End: 2024-11-08

## 2024-11-08 RX ORDER — REGADENOSON 0.08 MG/ML
0.4 INJECTION, SOLUTION INTRAVENOUS
Status: COMPLETED | OUTPATIENT
Start: 2024-11-08 | End: 2024-11-08

## 2024-11-08 RX ORDER — TETRAKIS(2-METHOXYISOBUTYLISOCYANIDE)COPPER(I) TETRAFLUOROBORATE 1 MG/ML
INJECTION, POWDER, LYOPHILIZED, FOR SOLUTION INTRAVENOUS
Status: CANCELLED | OUTPATIENT
Start: 2024-11-08

## 2024-11-08 RX ORDER — CEFUROXIME AXETIL 250 MG/1
500 TABLET ORAL EVERY 12 HOURS SCHEDULED
Status: DISCONTINUED | OUTPATIENT
Start: 2024-11-08 | End: 2024-11-09 | Stop reason: HOSPADM

## 2024-11-08 RX ORDER — PREDNISONE 20 MG/1
60 TABLET ORAL 2 TIMES DAILY
Status: DISCONTINUED | OUTPATIENT
Start: 2024-11-08 | End: 2024-11-08

## 2024-11-08 RX ORDER — FUROSEMIDE 20 MG/1
20 TABLET ORAL DAILY
Status: DISCONTINUED | OUTPATIENT
Start: 2024-11-08 | End: 2024-11-09 | Stop reason: HOSPADM

## 2024-11-08 RX ORDER — TETRAKIS(2-METHOXYISOBUTYLISOCYANIDE)COPPER(I) TETRAFLUOROBORATE 1 MG/ML
27 INJECTION, POWDER, LYOPHILIZED, FOR SOLUTION INTRAVENOUS
Status: COMPLETED | OUTPATIENT
Start: 2024-11-08 | End: 2024-11-08

## 2024-11-08 RX ADMIN — BUDESONIDE INHALATION 500 MCG: 0.5 SUSPENSION RESPIRATORY (INHALATION) at 07:03

## 2024-11-08 RX ADMIN — METHYLPREDNISOLONE SODIUM SUCCINATE 60 MG: 125 INJECTION, POWDER, LYOPHILIZED, FOR SOLUTION INTRAMUSCULAR; INTRAVENOUS at 01:55

## 2024-11-08 RX ADMIN — IPRATROPIUM BROMIDE AND ALBUTEROL SULFATE 1 DOSE: 2.5; .5 SOLUTION RESPIRATORY (INHALATION) at 14:07

## 2024-11-08 RX ADMIN — ENOXAPARIN SODIUM 40 MG: 100 INJECTION SUBCUTANEOUS at 08:09

## 2024-11-08 RX ADMIN — TETRAKIS(2-METHOXYISOBUTYLISOCYANIDE)COPPER(I) TETRAFLUOROBORATE 8 MILLICURIE: 1 INJECTION, POWDER, LYOPHILIZED, FOR SOLUTION INTRAVENOUS at 10:35

## 2024-11-08 RX ADMIN — CEFUROXIME AXETIL 500 MG: 250 TABLET ORAL at 20:22

## 2024-11-08 RX ADMIN — WATER 2000 MG: 1 INJECTION INTRAMUSCULAR; INTRAVENOUS; SUBCUTANEOUS at 01:54

## 2024-11-08 RX ADMIN — IPRATROPIUM BROMIDE AND ALBUTEROL SULFATE 1 DOSE: 2.5; .5 SOLUTION RESPIRATORY (INHALATION) at 06:55

## 2024-11-08 RX ADMIN — PREDNISONE 60 MG: 20 TABLET ORAL at 08:11

## 2024-11-08 RX ADMIN — METOPROLOL SUCCINATE 100 MG: 50 TABLET, EXTENDED RELEASE ORAL at 08:11

## 2024-11-08 RX ADMIN — PREDNISONE 30 MG: 20 TABLET ORAL at 17:27

## 2024-11-08 RX ADMIN — CHOLECALCIFEROL TAB 125 MCG (5000 UNIT) 5000 UNITS: 125 TAB at 08:13

## 2024-11-08 RX ADMIN — ATORVASTATIN CALCIUM 20 MG: 20 TABLET, FILM COATED ORAL at 20:02

## 2024-11-08 RX ADMIN — FUROSEMIDE 20 MG: 20 TABLET ORAL at 08:15

## 2024-11-08 RX ADMIN — AZITHROMYCIN MONOHYDRATE 500 MG: 500 INJECTION, POWDER, LYOPHILIZED, FOR SOLUTION INTRAVENOUS at 02:04

## 2024-11-08 RX ADMIN — BUDESONIDE INHALATION 500 MCG: 0.5 SUSPENSION RESPIRATORY (INHALATION) at 19:15

## 2024-11-08 RX ADMIN — AMLODIPINE BESYLATE 10 MG: 5 TABLET ORAL at 08:12

## 2024-11-08 RX ADMIN — TETRAKIS(2-METHOXYISOBUTYLISOCYANIDE)COPPER(I) TETRAFLUOROBORATE 27 MILLICURIE: 1 INJECTION, POWDER, LYOPHILIZED, FOR SOLUTION INTRAVENOUS at 11:43

## 2024-11-08 RX ADMIN — ARFORMOTEROL TARTRATE 15 MCG: 15 SOLUTION RESPIRATORY (INHALATION) at 19:15

## 2024-11-08 RX ADMIN — SODIUM CHLORIDE, PRESERVATIVE FREE 10 ML: 5 INJECTION INTRAVENOUS at 20:01

## 2024-11-08 RX ADMIN — OXYCODONE HYDROCHLORIDE AND ACETAMINOPHEN 1000 MG: 500 TABLET ORAL at 08:11

## 2024-11-08 RX ADMIN — REGADENOSON 0.4 MG: 0.08 INJECTION, SOLUTION INTRAVENOUS at 11:31

## 2024-11-08 RX ADMIN — IPRATROPIUM BROMIDE AND ALBUTEROL SULFATE 1 DOSE: 2.5; .5 SOLUTION RESPIRATORY (INHALATION) at 19:15

## 2024-11-08 RX ADMIN — LISINOPRIL 40 MG: 20 TABLET ORAL at 08:12

## 2024-11-08 RX ADMIN — SODIUM CHLORIDE, PRESERVATIVE FREE 10 ML: 5 INJECTION INTRAVENOUS at 08:16

## 2024-11-08 RX ADMIN — LEVOTHYROXINE SODIUM 50 MCG: 0.05 TABLET ORAL at 08:11

## 2024-11-08 RX ADMIN — ARFORMOTEROL TARTRATE 15 MCG: 15 SOLUTION RESPIRATORY (INHALATION) at 07:03

## 2024-11-08 ASSESSMENT — PAIN SCALES - GENERAL
PAINLEVEL_OUTOF10: 0

## 2024-11-08 NOTE — CARE COORDINATION
Attending informed me pt is not being discharged today but likely tomorrow.  Please insure portable tank from [employer] goes with pt if she is transferred to the floor as this tank is hers.I updated pt.'s nurse.    Martina Barger RN

## 2024-11-08 NOTE — PLAN OF CARE
Problem: Physical Therapy - Adult  Goal: By Discharge: Performs mobility at highest level of function for planned discharge setting.  See evaluation for individualized goals.  Description: FUNCTIONAL STATUS PRIOR TO ADMISSION: Patient was independent and active without use of DME.    HOME SUPPORT PRIOR TO ADMISSION: The patient lived with spouse but did not require assistance.    Physical Therapy Goals  Initiated 11/7/2024  1.  Patient will move from supine to sit and sit to supine, scoot up and down, and roll side to side in bed with independence within 7 day(s).    2.  Patient will perform sit to stand with modified independence within 7 day(s).  3.  Patient will transfer from bed to chair and chair to bed with modified independence using the least restrictive device within 7 day(s).  4.  Patient will ambulate with modified independence for 200 feet with the least restrictive device within 7 day(s).   5.  Patient will ascend/descend 4 stairs with  handrail(s) with modified independence within 7 day(s).   Outcome: Progressing   PHYSICAL THERAPY TREATMENT    Patient: Brittney Alston (74 y.o. female)  Date: 11/8/2024  Diagnosis: Hyponatremia [E87.1]  Acute respiratory failure with hypoxia and hypercarbia [J96.01, J96.02] Acute respiratory failure with hypoxia and hypercarbia      Precautions:                        ASSESSMENT:  Patient continues to benefit from skilled PT services and is progressing towards goals. Communicated with nurse cleared for therapy. Patient already up on the recliner when received. Sit to stand supervision, ambulate without assistive device in the room and out on the ICU hallway supervision no loss of balance steady sitting and standing. Patient desaturate on room air need 4 liter of oxygen with activity. Reviewed purse lip breathing and energy conservation. Ambulate back to the room and OOB to chair as tolerated performed some active range of motion exercise on both LE all planes. Educated

## 2024-11-08 NOTE — PROGRESS NOTES
Pt ordered for Home O2 eval.  Pt is high fall risk and being followed by PT.  Spoke with SIMBA Tony to assess.

## 2024-11-08 NOTE — CARE COORDINATION
Care Management Progress Note    Reason for Admission:   Hyponatremia [E87.1]  Acute respiratory failure with hypoxia and hypercarbia [J96.01, J96.02]         Patient Admission Status: Inpatient  RUR: 11%  Hospitalization in the last 30 days (Readmission):  no        Transition of care plan:  [Medical]  Acute heart failure  Hyponatremia  Needs home oxygen  [DC plan]  I received orders for home oxygen which have been sent to [facility]  Discharge plan communicated with patient and/or discharge caregiver: yes    Date 1st [facility] letter given: 11/6/24   Outpatient follow-up.Dr Chepe Larose   Transport at discharge:  family    Once oxygen has been set up I will notify pt.'s nurse and attending.    Martina Barger RN

## 2024-11-08 NOTE — PROGRESS NOTES
Nuclear stress with no ischemia    F/U op as planned       Future Appointments   Date Time Provider Department Center   11/22/2024 11:45 AM Chepe Larose MD [facility] DEP   12/13/2024  2:20 PM Porfirio Logan MD [facility]   1/13/2025  1:00 PM Chepe Larose MD [facility] DEP

## 2024-11-08 NOTE — PROGRESS NOTES
[facility] CARDIOLOGY                    Cardiology Care Note     []Initial Encounter     [x]Follow-up    Patient Name: Brittney Alston - :1950 - MRN:[id number]  Primary Cardiologist:   Consulting Cardiologist: Porfirio Logan MD     Reason for encounter: L BBB, nausea, hypoxia     HPI:       Brittney Alston is a 74 y.o. female with PMH of HTN, HLD, hypothyroidism, osteoporosis  who presents to the ER with about one week of SOB and nausea. Has also had increasing leg swelling over 2 months.     SPO2 64 % in ED    Denies CP, vomiting, changes to bowel habits, fever/chills. Denies h/o lung or heart disease.      add to hx : daytime somnolence, talking in her sleep , decrease activity level leg swelling for many months. No cardiac hx    Subjective:      Brittney Alston reports none     Assessment and Plan     1 Acute hypoxic resp failure:   - mixed picture (CHF, NORA, emphysema)  - pBNP 494, repeat   - chest CT with no PE  - off Bi Pap on nc   - start po lasix today  - I/O  - TTE w/ EF 55-60%, normal RV function, normal RVSP   - nebs  - IV steroids     2 Hyponatremia  - improving, up to 133    3 Incidental LBBB  - stress test today      4 HTN  - BP improved since admission, now elevated   - resumed home meds   - toprol, norvasc, zestril     5 Hypothyroidism    6. Obesity  Body mass index is 36.61 kg/m².        ____________________________________________________________    Cardiac testing  24    ECHO (TTE) COMPLETE (PRN CONTRAST/BUBBLE/STRAIN/3D) 2024  3:25 PM (Final)    Interpretation Summary    Left Ventricle: Normal left ventricular systolic function with a visually estimated EF of 55 - 60%. Left ventricle size is normal. Normal wall thickness. Normal wall motion. Abnormal diastolic function.    Tricuspid Valve: Normal RVSP.    Image quality is suboptimal. Contrast used: Definity. Procedure performed with the patient in a sitting position.    Signed by: Candice Colon MD on 2024   160 11/08/2024 05:28 AM    CALCIUM 8.0 11/08/2024 05:28 AM    LABGLOM >90 11/08/2024 05:28 AM      No results found for: \"BNP\"        Current meds:    Current Facility-Administered Medications:     ipratropium 0.5 mg-albuterol 2.5 mg (DUONEB) nebulizer solution 1 Dose, 1 Dose, Inhalation, Q6H Cristhian REEDER Victor H, MD, 1 Dose at 11/08/24 0655    methylPREDNISolone sodium succ (SOLU-MEDROL) 60 mg in sterile water 0.96 mL injection, 60 mg, IntraVENous, Q12H, Betty Cornell APRN - CNP, 60 mg at 11/08/24 0155    cefTRIAXone (ROCEPHIN) 2,000 mg in sterile water 20 mL IV syringe, 2,000 mg, IntraVENous, Q24H, Betty Cornell APRN - CNP, 2,000 mg at 11/08/24 0154    amLODIPine (NORVASC) tablet 10 mg, 10 mg, Oral, Daily, Saranya Daniel MD, 10 mg at 11/07/24 0824    ascorbic acid (VITAMIN C) tablet 1,000 mg, 1,000 mg, Oral, Daily, Noman Jacobs MD, 1,000 mg at 11/07/24 0824    atorvastatin (LIPITOR) tablet 20 mg, 20 mg, Oral, Nightly, Noman Jacobs MD, 20 mg at 11/07/24 2040    levothyroxine (SYNTHROID) tablet 50 mcg, 50 mcg, Oral, Daily, Noman Jacobs MD, 50 mcg at 11/07/24 0654    lisinopril (PRINIVIL;ZESTRIL) tablet 40 mg, 40 mg, Oral, Daily, Saranya Daniel MD, 40 mg at 11/07/24 0824    metoprolol succinate (TOPROL XL) extended release tablet 100 mg, 100 mg, Oral, Daily, Saranya Daniel MD    vitamin D3 (CHOLECALCIFEROL) tablet 5,000 Units, 5,000 Units, Oral, Daily, Noman Jacobs MD, 5,000 Units at 11/07/24 0824    [Held by provider] oxyCODONE (ROXICODONE) immediate release tablet 5 mg, 5 mg, Oral, Q6H PRN, Noman Jacobs MD    sodium chloride flush 0.9 % injection 5-40 mL, 5-40 mL, IntraVENous, 2 times per day, Noman Jacobs MD, 10 mL at 11/07/24 0825    sodium chloride flush 0.9 % injection 5-40 mL, 5-40 mL, IntraVENous, PRN, Noman Jacobs MD, 10 mL at 11/07/24 0305    0.9 % sodium chloride infusion, , IntraVENous, PRN, Noman Jacobs MD    enoxaparin (LOVENOX) injection 40 mg, 40 mg, SubCUTAneous, Daily,

## 2024-11-08 NOTE — CARE COORDINATION
Update:  [employer] has accepted pt for home oxygen needs   Oxygen has been delivered to pt.'s room by [employer]  Nurse updated and attending.     will transport pt home.    Martina Barger RN

## 2024-11-08 NOTE — PLAN OF CARE
Problem: Discharge Planning  Goal: Discharge to home or other facility with appropriate resources  Outcome: Progressing  Flowsheets (Taken 11/8/2024 0740)  Discharge to home or other facility with appropriate resources: Identify barriers to discharge with patient and caregiver     Problem: Pain  Goal: Verbalizes/displays adequate comfort level or baseline comfort level  Outcome: Progressing  Flowsheets (Taken 11/8/2024 0803)  Verbalizes/displays adequate comfort level or baseline comfort level: Encourage patient to monitor pain and request assistance     Problem: ABCDS Injury Assessment  Goal: Absence of physical injury  Outcome: Progressing     Problem: Safety - Adult  Goal: Free from fall injury  Outcome: Progressing     Problem: Respiratory - Adult  Goal: Achieves optimal ventilation and oxygenation  Outcome: Progressing  Flowsheets (Taken 11/8/2024 0740)  Achieves optimal ventilation and oxygenation:   Assess for changes in respiratory status   Assess for changes in mentation and behavior     Problem: Chronic Conditions and Co-morbidities  Goal: Patient's chronic conditions and co-morbidity symptoms are monitored and maintained or improved  Outcome: Progressing     Problem: Skin/Tissue Integrity  Goal: Absence of new skin breakdown  Description: 1.  Monitor for areas of redness and/or skin breakdown  2.  Assess vascular access sites hourly  3.  Every 4-6 hours minimum:  Change oxygen saturation probe site  4.  Every 4-6 hours:  If on nasal continuous positive airway pressure, respiratory therapy assess nares and determine need for appliance change or resting period.  Outcome: Progressing

## 2024-11-08 NOTE — DISCHARGE INSTRUCTIONS
HOME DISCHARGE INSTRUCTIONS    Brittney Alston / [id number] : 1950    Admission date: 2024 Discharge date: 2024     Please bring this form with you to show your care provider at your follow-up appointment.    Primary care provider:  Chepe Larose      Discharging provider:  Van Melgar MD  - Family Medicine Resident  Elsi Tejada MD - Attending     You have been admitted to the hospital with the following diagnoses:    ACUTE DIAGNOSES:  Hyponatremia [E87.1]  Acute respiratory failure with hypoxia and hypercarbia [J96.01, J96.02]      . . . . . . . . . . . . . . . . . . . . . . . . . . . . . . . . . . . . . . . . . . . . . . . . . . . . . . . . . . . . . . . . . . . . . . . .   You were hospitalized for shortness of breath and low oxygen levels. You were put on BiPAP to assist your breathing, which helped. You were also treated with medication to help you urinate out extra fluid and for your underlying COPD. You were also started on antibiotics to treat any possible pneumonia. You underwent a cardiac stress test which showed no abnormalities. You have improved and are safe to discharge home with home oxygen.   . . . . . . . . . . . . . . . . . . . . . . . . . . . . . . . . . . . . . . . . . . . . . . . . . . . . . . . . . . . . . . . . . . . . . . . .     MEDICATION CHANGES  START  Cefuroxime 500mg, 1 tablet, twice daily for 3 doses. Starting the evening of 24. - This is an antibiotic to treat any underlying pneumonia.    Trelegy Ellipta inhaler, 1 puff, once daily - This is an inhaler to treat your COPD. If there is any difficulty getting Trelegy, let us know and we can send a different inhaler    Albuterol inhaler, 2 puffs every 4 hours as needed. This is can help with your COPD as well.    Prednisone taper - This is a steroid to treat your COPD. Please follow instructions on prescription bottles:  40 mg (4 tablets) daily for 3 days, THEN 30 mg (3 tablets) daily for 3 days, THEN 20  mg (2 tablets) daily for 3 days, THEN 10 mg (1 tablet) daily for 3 days, then 5 mg (0.5 tablet) daily for 3 days    Lasix 20mg, 1 tablet, once daily - This is to help you urinate out extra fluid.    STOP  No medications were stopped.    CHANGES  No medications were changed.    No other changes were made to your medications, please take all your other home medicines as previously prescribed.    Appointments  Future Appointments   Date Time Provider Department Center   11/22/2024 11:45 AM Chepe Larose MD [facility]   12/13/2024  2:20 PM [facility]   1/13/2025  1:00 PM Chepe Larose MD [facility]     Porfirio Logan MD  [address] [facility] [address]  [facility] [id number] [phone]    Follow up on 12/13/2024  2:20 pm    [facility]  [address] [address]  Call  to establish care for your COPD    Chepe Larose MD  [address] [facility] [id number] [phone]    Go to  hospital follow up       Please follow up with your PCP regarding:  - changes to medications, including inhalers  - follow up for sleep study/NORA management  - monitor sodium levels given low levels in hospital    Please follow up with Pulmonology regarding:  - establish care, medication regimen for COPD  - home O2 needs    Please follow up with Cardiology regarding:  - heart failure and HTN management    Follow-up tests needed: BMP (Na level)    Pending test results:   At the time of your discharge the following test results are still pending: None  Please make sure you review these results with your outpatient follow-up provider(s).    Specific symptoms to watch for: chest pain, shortness of breath, fever, chills, nausea, vomiting, diarrhea, change in mentation, falling, weakness, bleeding.     DIET/what to eat:  cardiac diet    ACTIVITY:  activity as tolerated    Wound care: None    Equipment needed:  O2 Supplies (ready at

## 2024-11-08 NOTE — DISCHARGE SUMMARY
[address] [place] [address]   Office [phone]  Fax [phone]       Discharge / Transfer / Off-Service Note     Name: Brittney Alston MRN: [id number]  Sex: Female   YOB: 1950  Age: 74 y.o.  PCP: Chepe Larose MD     Date of admission: 11/5/2024  Date of discharge/transfer: 11/9/2024    Attending physician at admission: Brett Morrow MD  Attending physician at discharge/transfer: Elsi Tejada MD  Resident physician at discharge/transfer: Van Melgar MD     Consultants during hospitalization  IP CONSULT TO CARDIOLOGY  IP CONSULT TO INTENSIVIST  IP CONSULT TO CASE MANAGEMENT  IP CONSULT TO CASE MANAGEMENT     Admission diagnoses   Hyponatremia [E87.1]  Acute respiratory failure with hypoxia and hypercarbia [J96.01, J96.02]    Recommended follow-up after discharge    1. PCP-Chepe Larose MD  2. Cardiology-Dr. Logan  3. Pulmonology     To follow up on with PCP:   - changes to medications at discharge  - follow up for sleep study/NORA management  - monitor sodium levels given low levels in hospital  ------------------------------------------------------------------------------------------------------------------    History of Present Illness    As per admitting provider, Dr. Jacobs:   \"Brittney Alston is a 74 y.o. female with PMH HTN, HLD, hypothyroidism, osteoporosis  who presents to the ER with about one week of SOB and nausea. Has also had increasing leg swelling over 2 months. Denies CP, vomiting, changes to bowel habits, fever/chills. Denies h/o lung or heart disease.  also present and mentions she seemed confused over past week or so. \"      Hospital course  Brittney Alston was admitted into the Family Medicine Service from 11/5/2024 to 11/9/2024 for Hyponatremia [E87.1]  Acute respiratory failure with hypoxia and hypercarbia [J96.01, J96.02]. She was treated for multifactorial AHHRF with multimodal treatment of diuresis, COPD management, antibiotics, and  of 55 - 60%. Left ventricle size is normal. Normal wall thickness. Normal wall motion. Abnormal diastolic function.  Tricuspid Valve: Normal RVSP.  Image quality is suboptimal. Contrast used: Definity. Procedure performed with the patient in a sitting position.    Nuclear stress test 11/8/24:   Normal Lexiscan pharmacological stress nuclear test with normal perfusion, no ischemia or infarction.  Normal left ventricular systolic function with an ejection fraction of 69 %.  Normal EKG response to pharmacological stress .          Chronic diagnoses   Patient Active Problem List   Diagnosis    Acute respiratory failure with hypoxia and hypercarbia    Dyspnea    Acute congestive heart failure (HCC)    Hyponatremia    Primary hypertension    Acquired hypothyroidism    LBBB (left bundle branch block)           Medication List        START taking these medications      albuterol sulfate  (90 Base) MCG/ACT inhaler  Commonly known as: Ventolin HFA  Inhale 2 puffs into the lungs every 4 hours as needed for Wheezing     cefUROXime 500 MG tablet  Commonly known as: CEFTIN  Take 1 tablet by mouth every 12 hours for 3 doses     furosemide 20 MG tablet  Commonly known as: LASIX  Take 1 tablet by mouth daily     predniSONE 10 MG tablet  Commonly known as: DELTASONE  Take 4 tablets by mouth daily for 3 days, THEN 3 tablets daily for 3 days, THEN 2 tablets daily for 3 days, THEN 1 tablet daily for 3 days, THEN 0.5 tablets daily for 3 days.  Start taking on: November 10, 2024     Trelegy Ellipta 100-62.5-25 MCG/ACT Aepb inhaler  Generic drug: fluticasone-umeclidin-vilant  Inhale 1 puff into the lungs daily            CONTINUE taking these medications      acetaminophen 500 MG tablet  Commonly known as: TYLENOL     alendronate 70 MG tablet  Commonly known as: FOSAMAX  Take 1 tablet by mouth every 7 days     amLODIPine 10 MG tablet  Commonly known as: NORVASC  Take 1 tablet by mouth daily     ascorbic acid 1000 MG tablet  Commonly  discussed with Dr. Tejada (attending).    Van Melgar MD  [facility] Medicine Resident       For Billing    Chief Complaint   Patient presents with    Shortness of Breath

## 2024-11-08 NOTE — PROGRESS NOTES
TRANSFER - OUT REPORT:    Verbal report given to Christie HICKS on Brittney Alston  being transferred to [facility] for routine progression of patient care       Report consisted of patient's Situation, Background, Assessment and   Recommendations(SBAR).     Information from the following report(s) Nurse Handoff Report, Adult Overview, Intake/Output, and Recent Results was reviewed with the receiving nurse.    Lines:   Peripheral IV 11/08/24 Left Antecubital (Active)        Opportunity for questions and clarification was provided.      Patient transported with:  02, Registered Nurse

## 2024-11-09 VITALS
HEART RATE: 64 BPM | WEIGHT: 200.18 LBS | DIASTOLIC BLOOD PRESSURE: 58 MMHG | BODY MASS INDEX: 36.84 KG/M2 | TEMPERATURE: 98.2 F | HEIGHT: 62 IN | OXYGEN SATURATION: 96 % | RESPIRATION RATE: 17 BRPM | SYSTOLIC BLOOD PRESSURE: 153 MMHG

## 2024-11-09 DIAGNOSIS — E78.5 HYPERLIPIDEMIA, UNSPECIFIED HYPERLIPIDEMIA TYPE: ICD-10-CM

## 2024-11-09 LAB
ALBUMIN SERPL-MCNC: 3.5 G/DL (ref 3.5–5)
ALBUMIN/GLOB SERPL: 1.5 (ref 1.1–2.2)
ALP SERPL-CCNC: 82 U/L (ref 45–117)
ALT SERPL-CCNC: 26 U/L (ref 12–78)
ANION GAP SERPL CALC-SCNC: 2 MMOL/L (ref 2–12)
ANION GAP SERPL CALC-SCNC: 2 MMOL/L (ref 2–12)
AST SERPL-CCNC: 25 U/L (ref 15–37)
BASOPHILS # BLD: 0 K/UL (ref 0–0.1)
BASOPHILS NFR BLD: 0 % (ref 0–1)
BILIRUB SERPL-MCNC: 0.5 MG/DL (ref 0.2–1)
BUN SERPL-MCNC: 16 MG/DL (ref 6–20)
BUN SERPL-MCNC: 18 MG/DL (ref 6–20)
BUN/CREAT SERPL: 24 (ref 12–20)
BUN/CREAT SERPL: 26 (ref 12–20)
CALCIUM SERPL-MCNC: 8.5 MG/DL (ref 8.5–10.1)
CALCIUM SERPL-MCNC: 8.9 MG/DL (ref 8.5–10.1)
CHLORIDE SERPL-SCNC: 92 MMOL/L (ref 97–108)
CHLORIDE SERPL-SCNC: 97 MMOL/L (ref 97–108)
CO2 SERPL-SCNC: 35 MMOL/L (ref 21–32)
CO2 SERPL-SCNC: 37 MMOL/L (ref 21–32)
CREAT SERPL-MCNC: 0.68 MG/DL (ref 0.55–1.02)
CREAT SERPL-MCNC: 0.68 MG/DL (ref 0.55–1.02)
DIFFERENTIAL METHOD BLD: ABNORMAL
EOSINOPHIL # BLD: 0 K/UL (ref 0–0.4)
EOSINOPHIL NFR BLD: 0 % (ref 0–7)
ERYTHROCYTE [DISTWIDTH] IN BLOOD BY AUTOMATED COUNT: 14 % (ref 11.5–14.5)
GLOBULIN SER CALC-MCNC: 2.4 G/DL (ref 2–4)
GLUCOSE SERPL-MCNC: 118 MG/DL (ref 65–100)
GLUCOSE SERPL-MCNC: 144 MG/DL (ref 65–100)
HCT VFR BLD AUTO: 42.4 % (ref 35–47)
HGB BLD-MCNC: 13.3 G/DL (ref 11.5–16)
IMM GRANULOCYTES # BLD AUTO: 0.1 K/UL (ref 0–0.04)
IMM GRANULOCYTES NFR BLD AUTO: 1 % (ref 0–0.5)
LYMPHOCYTES # BLD: 1.3 K/UL (ref 0.8–3.5)
LYMPHOCYTES NFR BLD: 10 % (ref 12–49)
MAGNESIUM SERPL-MCNC: 2.6 MG/DL (ref 1.6–2.4)
MCH RBC QN AUTO: 29.9 PG (ref 26–34)
MCHC RBC AUTO-ENTMCNC: 31.4 G/DL (ref 30–36.5)
MCV RBC AUTO: 95.3 FL (ref 80–99)
MONOCYTES # BLD: 0.8 K/UL (ref 0–1)
MONOCYTES NFR BLD: 6 % (ref 5–13)
NEUTS SEG # BLD: 10.6 K/UL (ref 1.8–8)
NEUTS SEG NFR BLD: 83 % (ref 32–75)
NRBC # BLD: 0 K/UL (ref 0–0.01)
NRBC BLD-RTO: 0 PER 100 WBC
PLATELET # BLD AUTO: 311 K/UL (ref 150–400)
PMV BLD AUTO: 10.1 FL (ref 8.9–12.9)
POTASSIUM SERPL-SCNC: 5 MMOL/L (ref 3.5–5.1)
POTASSIUM SERPL-SCNC: 5.3 MMOL/L (ref 3.5–5.1)
PROT SERPL-MCNC: 5.9 G/DL (ref 6.4–8.2)
RBC # BLD AUTO: 4.45 M/UL (ref 3.8–5.2)
SODIUM SERPL-SCNC: 131 MMOL/L (ref 136–145)
SODIUM SERPL-SCNC: 134 MMOL/L (ref 136–145)
WBC # BLD AUTO: 12.8 K/UL (ref 3.6–11)

## 2024-11-09 PROCEDURE — 6370000000 HC RX 637 (ALT 250 FOR IP): Performed by: NURSE PRACTITIONER

## 2024-11-09 PROCEDURE — 83735 ASSAY OF MAGNESIUM: CPT

## 2024-11-09 PROCEDURE — 6370000000 HC RX 637 (ALT 250 FOR IP)

## 2024-11-09 PROCEDURE — 85025 COMPLETE CBC W/AUTO DIFF WBC: CPT

## 2024-11-09 PROCEDURE — 99238 HOSP IP/OBS DSCHRG MGMT 30/<: CPT | Performed by: FAMILY MEDICINE

## 2024-11-09 PROCEDURE — 6370000000 HC RX 637 (ALT 250 FOR IP): Performed by: FAMILY MEDICINE

## 2024-11-09 PROCEDURE — 80053 COMPREHEN METABOLIC PANEL: CPT

## 2024-11-09 PROCEDURE — 6360000002 HC RX W HCPCS

## 2024-11-09 PROCEDURE — 94640 AIRWAY INHALATION TREATMENT: CPT

## 2024-11-09 PROCEDURE — 2580000003 HC RX 258

## 2024-11-09 PROCEDURE — 2700000000 HC OXYGEN THERAPY PER DAY

## 2024-11-09 PROCEDURE — 94660 CPAP INITIATION&MGMT: CPT

## 2024-11-09 PROCEDURE — 36415 COLL VENOUS BLD VENIPUNCTURE: CPT

## 2024-11-09 RX ORDER — FLUTICASONE FUROATE, UMECLIDINIUM BROMIDE AND VILANTEROL TRIFENATATE 100; 62.5; 25 UG/1; UG/1; UG/1
1 POWDER RESPIRATORY (INHALATION) DAILY
Qty: 28 EACH | Refills: 0 | Status: SHIPPED | OUTPATIENT
Start: 2024-11-09

## 2024-11-09 RX ORDER — PREDNISONE 10 MG/1
TABLET ORAL
Qty: 32 TABLET | Refills: 0 | Status: SHIPPED | OUTPATIENT
Start: 2024-11-10 | End: 2024-11-25

## 2024-11-09 RX ORDER — CEFUROXIME AXETIL 500 MG/1
500 TABLET ORAL EVERY 12 HOURS SCHEDULED
Qty: 3 TABLET | Refills: 0 | Status: SHIPPED | OUTPATIENT
Start: 2024-11-09 | End: 2024-11-11

## 2024-11-09 RX ORDER — ALBUTEROL SULFATE 90 UG/1
2 INHALANT RESPIRATORY (INHALATION) EVERY 4 HOURS PRN
Qty: 18 G | Refills: 0 | Status: SHIPPED | OUTPATIENT
Start: 2024-11-09

## 2024-11-09 RX ORDER — FUROSEMIDE 20 MG/1
20 TABLET ORAL DAILY
Qty: 30 TABLET | Refills: 0 | Status: SHIPPED | OUTPATIENT
Start: 2024-11-09

## 2024-11-09 RX ADMIN — ENOXAPARIN SODIUM 40 MG: 100 INJECTION SUBCUTANEOUS at 08:29

## 2024-11-09 RX ADMIN — FUROSEMIDE 20 MG: 20 TABLET ORAL at 08:30

## 2024-11-09 RX ADMIN — IPRATROPIUM BROMIDE AND ALBUTEROL SULFATE 1 DOSE: 2.5; .5 SOLUTION RESPIRATORY (INHALATION) at 08:58

## 2024-11-09 RX ADMIN — BUDESONIDE INHALATION 500 MCG: 0.5 SUSPENSION RESPIRATORY (INHALATION) at 08:58

## 2024-11-09 RX ADMIN — CHOLECALCIFEROL TAB 125 MCG (5000 UNIT) 5000 UNITS: 125 TAB at 08:30

## 2024-11-09 RX ADMIN — LEVOTHYROXINE SODIUM 50 MCG: 0.05 TABLET ORAL at 05:26

## 2024-11-09 RX ADMIN — CEFUROXIME AXETIL 500 MG: 250 TABLET ORAL at 10:02

## 2024-11-09 RX ADMIN — ARFORMOTEROL TARTRATE 15 MCG: 15 SOLUTION RESPIRATORY (INHALATION) at 08:58

## 2024-11-09 RX ADMIN — PREDNISONE 30 MG: 20 TABLET ORAL at 08:31

## 2024-11-09 RX ADMIN — OXYCODONE HYDROCHLORIDE AND ACETAMINOPHEN 1000 MG: 500 TABLET ORAL at 08:31

## 2024-11-09 RX ADMIN — LISINOPRIL 40 MG: 20 TABLET ORAL at 08:31

## 2024-11-09 RX ADMIN — METOPROLOL SUCCINATE 100 MG: 50 TABLET, EXTENDED RELEASE ORAL at 08:30

## 2024-11-09 RX ADMIN — AMLODIPINE BESYLATE 10 MG: 5 TABLET ORAL at 08:30

## 2024-11-09 RX ADMIN — SODIUM CHLORIDE, PRESERVATIVE FREE 10 ML: 5 INJECTION INTRAVENOUS at 08:33

## 2024-11-09 NOTE — FLOWSHEET NOTE
11/08/24 2100   Vitals   Pulse (!) (S)  130  (bigeminy sustained in the 130s-asymptomatic, laying in bed watching TV-On-call family practice resident notified)   Cardiac Rhythm Vent bigeminy     Leads were assessed and pt was reassessed by RN. Pt was then assisted to the bathroom after her HR came back down to the 90s and was sustained. Pt requesting RT to put her back on BiPAP. RT paged. No new interventions ordered at this time.

## 2024-11-09 NOTE — PROGRESS NOTES
[address] [place] [address]   Office [phone]  Fax [phone]          Subjective / Objective     Subjective  Overnight Events: None     Patient seen and examined at bedside. Feeling well today, no complaints.    Respiratory:   BiPAP while sleeping; 3L NC while awake  Vitals:    11/09/24 0815   BP: (!) 155/92   Pulse: 70   Resp: 17   Temp: 97.7 °F (36.5 °C)   SpO2: 96%     Physical Examination:   General appearance - alert, well appearing, and in no distress  Chest - slight end expiratory wheezing in bases, rhonchorous, no crackles.  Heart - normal rate, regular rhythm, normal S1, S2, no murmurs, rubs, clicks or gallops,   Neurological - alert, oriented, normal speech  Skin - warm, dry. No notable rashes  Extremities - peripheral pulses normal, +2 pedal edema bilaterally, no clubbing or cyanosis  Psychiatric - normal speech and thought processes    I/O:  11/08 0701 - 11/09 0700  In: 110 [P.O.:100; I.V.:10]  Out: 300 [Urine:300]    Inpatient Medications    Current Facility-Administered Medications   Medication Dose Route Frequency    furosemide (LASIX) tablet 20 mg  20 mg Oral Daily    predniSONE (DELTASONE) tablet 30 mg  30 mg Oral BID WC    cefUROXime (CEFTIN) tablet 500 mg  500 mg Oral 2 times per day    ipratropium 0.5 mg-albuterol 2.5 mg (DUONEB) nebulizer solution 1 Dose  1 Dose Inhalation Q6H WA RT    amLODIPine (NORVASC) tablet 10 mg  10 mg Oral Daily    ascorbic acid (VITAMIN C) tablet 1,000 mg  1,000 mg Oral Daily    atorvastatin (LIPITOR) tablet 20 mg  20 mg Oral Nightly    levothyroxine (SYNTHROID) tablet 50 mcg  50 mcg Oral Daily    lisinopril (PRINIVIL;ZESTRIL) tablet 40 mg  40 mg Oral Daily    metoprolol succinate (TOPROL XL) extended release tablet 100 mg  100 mg Oral Daily    vitamin D3 (CHOLECALCIFEROL) tablet 5,000 Units  5,000 Units Oral Daily    [Held by provider] oxyCODONE (ROXICODONE) immediate release tablet 5 mg  5 mg Oral Q6H PRN    sodium chloride flush 0.9 %  Test 11/8/24  Stress Combined Conclusion: Normal pharmacological myocardial perfusion study.          Assessment and Plan     Brittney Alston is a 74 y.o. female with PMH of reported HF, COPD, HTN, HLD, hypothyroidism, and osteoporosis who presented with nausea and SOB and is admitted for AHHRF iso multiple chronic conditions.     AHHRF: Likely multifactorial. TTE with evidence of diastolic dysfunction, hx of COPD/emphysema, and likely NORA are most likely contributors. Also considering infectious etiology but no obvious PNA or other source. Lenginella, s. Pneumo, RVP negative. Less suspicion for ACS but LBBB on EKG concerning, though no prior EKGs to compare. Nuc stress test nonischemic. O2 walk: 4L NC. S/p azithromycin/ceftriaxone (11/6-11/8)  - BiPAP while sleeping  - daily weights, elevate HOB, strict I/O  - Cardiology c/s; appreciate recs   - PO Lasix 20 mg daily  - DuoNebs  - Brovana/Pulmicort  - Prednisone 30 mg bid  - Continue cefuroxime 500 q12h through 11/10  - daily CBC, CMP, Mg  - PT/OT said no needs; discharge home    Hyponatremia: could contribute to confusion on admission. Correcting without intervention. May be 2/2 CHF. Urine Na 10, Urine Osm 421, so likely extra renal losses/pre renal. Mentation at baseline.   - fluid restriction  - CTM    HTN  HLD  HFpEF: TTE with evidence of diastolic dysfunction. Continue home lisinopril 40mg qd, amlodipine 10mg qd, toprol 100mg qd, lipitor 40mg qd.  - continue home lisinopril, amlodipine, toprol  - continue home lipitor     Hypothyroidism: continue home levothyroxine 50mcg qam. TSH 1.82 here.   - continue home med     Osteoporosis: on home fosamax weekly.  - hold home med     Obesity: Body mass index is 36.85 kg/m².  - Encourage lifestyle modifications and further follow up outpatient     FEN/GI - Regular.   Activity - Out of bed with assistance  DVT prophylaxis - Lovenox  GI prophylaxis - Not indicated at this time  Fall prophylaxis - Fall precautions  ordered.  Disposition - Plan to d/c to Home, PT/OT no needs  Code Status - Full, discussed with patient / caregivers.  Point of Contact Andrew Alston (Spouse)  [phone]     Patient discussed with Elsi Tejada MD Jacob A Whitley, MD  [facility] Medicine Resident       For Billing    Chief Complaint   Patient presents with    Shortness of Breath

## 2024-11-09 NOTE — FLOWSHEET NOTE
11/08/24 2147   Vitals   Pulse 78   Heart Rate Source Monitor   BP (!) 150/55  (repeat BP requested by on-call Family Practice Resident-pt was woken up from sleeping-still asymptomatic-Provider notified)   MAP (Calculated) 87   BP Location Left upper arm   BP Upper/Lower Upper   BP Method Automatic   Patient Position Right side

## 2024-11-09 NOTE — PLAN OF CARE
Problem: Discharge Planning  Goal: Discharge to home or other facility with appropriate resources  11/9/2024 0451 by Joana Del Valle RN  Outcome: Progressing  Flowsheets  Taken 11/8/2024 2000 by Deysi Florian RN  Discharge to home or other facility with appropriate resources: (plan to d/c in AM-home O2 already at bedside in pt's room)   Identify barriers to discharge with patient and caregiver   Arrange for needed discharge resources and transportation as appropriate   Identify discharge learning needs (meds, wound care, etc)   Refer to discharge planning if patient needs post-hospital services based on physician order or complex needs related to functional status, cognitive ability or social support system  Taken 11/8/2024 1630 by Christie Cardona RN  Discharge to home or other facility with appropriate resources: Identify barriers to discharge with patient and caregiver  11/8/2024 1454 by Chavo Salazar, PT  Outcome: Progressing     Problem: Pain  Goal: Verbalizes/displays adequate comfort level or baseline comfort level  Outcome: Progressing  Flowsheets (Taken 11/8/2024 2002 by Deysi Florian RN)  Verbalizes/displays adequate comfort level or baseline comfort level:   Encourage patient to monitor pain and request assistance   Assess pain using appropriate pain scale   Administer analgesics based on type and severity of pain and evaluate response   Implement non-pharmacological measures as appropriate and evaluate response     Problem: ABCDS Injury Assessment  Goal: Absence of physical injury  Outcome: Progressing  Flowsheets (Taken 11/8/2024 2000 by Deysi Florian RN)  Absence of Physical Injury: Implement safety measures based on patient assessment     Problem: Safety - Adult  Goal: Free from fall injury  Outcome: Progressing  Flowsheets (Taken 11/8/2024 2000 by Deysi Florian RN)  Free From Fall Injury: Instruct family/caregiver on patient safety     Problem:

## 2024-11-09 NOTE — CARE COORDINATION
Case Management Discharge Note    Discharge Plan:  Patient discharging to home today with DME:  Home O2.  [employer] [phone]--Accepted.    CM met with patient and patient's spouse at bedside to discuss DCP updates.  Patient is agreeable to the discharge plan.    Spouse confirmed that the O2 concentrator has already been delivered to their house.  O2 portable tank is at bedside.      Transportation at DC:  Spouse.      Patient and spouse stated no other needs.  Medical treatment team informed of updates.    ______________________________________  ROBERTO CARLOS Marquis, RN-CM  [facility]- Care Management  Available via [employer]  11/9/2024  12:15 PM

## 2024-11-09 NOTE — PROGRESS NOTES
Discharge orders received. Discharge instructions discussed with patient and patient's . Questions answered by this nurse. IV removed by this nurse. O2 education discussed with . Patient wheeled down in wheelchair by volunteer.

## 2024-11-09 NOTE — PLAN OF CARE
Problem: Discharge Planning  Goal: Discharge to home or other facility with appropriate resources  11/9/2024 1013 by Yessica Titus RN  Outcome: Progressing  11/9/2024 0451 by Joana Del Valle RN  Outcome: Progressing  Flowsheets  Taken 11/8/2024 2000 by Deysi Florian RN  Discharge to home or other facility with appropriate resources: (plan to d/c in AM-home O2 already at bedside in pt's room)   Identify barriers to discharge with patient and caregiver   Arrange for needed discharge resources and transportation as appropriate   Identify discharge learning needs (meds, wound care, etc)   Refer to discharge planning if patient needs post-hospital services based on physician order or complex needs related to functional status, cognitive ability or social support system  Taken 11/8/2024 1630 by Christie Cardona RN  Discharge to home or other facility with appropriate resources: Identify barriers to discharge with patient and caregiver     Problem: Pain  Goal: Verbalizes/displays adequate comfort level or baseline comfort level  11/9/2024 1013 by Yessica Titus RN  Outcome: Progressing  11/9/2024 0451 by Joana Del Valle RN  Outcome: Progressing  Flowsheets (Taken 11/8/2024 2002 by Deysi Florian RN)  Verbalizes/displays adequate comfort level or baseline comfort level:   Encourage patient to monitor pain and request assistance   Assess pain using appropriate pain scale   Administer analgesics based on type and severity of pain and evaluate response   Implement non-pharmacological measures as appropriate and evaluate response     Problem: ABCDS Injury Assessment  Goal: Absence of physical injury  11/9/2024 1013 by Yessica Titus RN  Outcome: Progressing  11/9/2024 0451 by Joana Del Valle RN  Outcome: Progressing  Flowsheets (Taken 11/8/2024 2000 by Deysi Florian RN)  Absence of Physical Injury: Implement safety measures based on patient assessment     Problem: Safety -  Adult  Goal: Free from fall injury  11/9/2024 1013 by Yessica Titus RN  Outcome: Progressing  11/9/2024 0451 by Joana Del Valle RN  Outcome: Progressing  Flowsheets (Taken 11/8/2024 2000 by Deysi Florian RN)  Free From Fall Injury: Instruct family/caregiver on patient safety     Problem: Respiratory - Adult  Goal: Achieves optimal ventilation and oxygenation  Outcome: Progressing     Problem: Chronic Conditions and Co-morbidities  Goal: Patient's chronic conditions and co-morbidity symptoms are monitored and maintained or improved  Outcome: Progressing     Problem: Skin/Tissue Integrity  Goal: Absence of new skin breakdown  Description: 1.  Monitor for areas of redness and/or skin breakdown  2.  Assess vascular access sites hourly  3.  Every 4-6 hours minimum:  Change oxygen saturation probe site  4.  Every 4-6 hours:  If on nasal continuous positive airway pressure, respiratory therapy assess nares and determine need for appliance change or resting period.  11/9/2024 1013 by Yessica Titus RN  Outcome: Progressing  11/9/2024 0451 by Joana Del Valle RN  Outcome: Progressing

## 2024-11-09 NOTE — PROGRESS NOTES
Patient in [facility]. Patient asymptomatic in room. Family Practice made aware.

## 2024-11-11 ENCOUNTER — TELEPHONE (OUTPATIENT)
Age: 74
End: 2024-11-11

## 2024-11-11 RX ORDER — ATORVASTATIN CALCIUM 20 MG/1
20 TABLET, FILM COATED ORAL DAILY
Qty: 90 TABLET | Refills: 1 | Status: SHIPPED | OUTPATIENT
Start: 2024-11-11

## 2024-11-11 NOTE — TELEPHONE ENCOUNTER
Care Transitions Initial Follow Up Call    Outreach made within 2 business days of discharge: Yes    Patient: Brittney Alston Patient : 1950   MRN: [id number]  Reason for Admission: Acute respiratory failure with hypoxia and hypercarbia   Discharge Date: 24       Spoke with: patient    Discharge department/facility: [facility]    TCM Interactive Patient Contact:  Was patient able to fill all prescriptions: Yes  Was patient instructed to bring all medications to the follow-up visit: Yes  Is patient taking all medications as directed in the discharge summary? Yes  Does patient understand their discharge instructions: Yes  Does patient have questions or concerns that need addressed prior to 7-14 day follow up office visit: no    Additional needs identified to be addressed with provider  No needs identified             Scheduled appointment with PCP within 7-14 days    Follow Up  Future Appointments   Date Time Provider Department Center   2024 11:45 AM Chepe Larose MD [facility] DEP   2024  2:20 PM Porfirio Logan MD [facility]   2025  1:00 PM Chepe Larose MD [facility] DEP       NOAH LERMA MA

## 2024-11-13 ENCOUNTER — TELEPHONE (OUTPATIENT)
Age: 74
End: 2024-11-13

## 2024-11-13 NOTE — TELEPHONE ENCOUNTER
Care Transitions Initial Follow Up Call    Outreach made within 2 business days of discharge: Yes    Patient: Brittney Alston Patient : 1950   MRN: [id number]  Reason for Admission: hypoxia   Discharge Date: 24       Spoke with: pt    Discharge department/facility: [facility]    TCM Interactive Patient Contact:  Was patient able to fill all prescriptions: Yes  Was patient instructed to bring all medications to the follow-up visit: Yes  Is patient taking all medications as directed in the discharge summary? Yes  Does patient understand their discharge instructions: Yes  Does patient have questions or concerns that need addressed prior to 7-14 day follow up office visit: no    Additional needs identified to be addressed with provider  No needs identified             Scheduled appointment with PCP within 7-14 days    Follow Up  Future Appointments   Date Time Provider Department Center   2024 11:45 AM Chepe Larose MD [facility]   2024  2:20 PM Porfirio Logan MD [facility]   2025  1:00 PM Chepe Larose MD [facility]       GEOVANI RUSSELL MA

## 2024-11-18 ENCOUNTER — LAB (OUTPATIENT)
Age: 74
End: 2024-11-18

## 2024-11-18 ENCOUNTER — OFFICE VISIT (OUTPATIENT)
Age: 74
End: 2024-11-18

## 2024-11-18 VITALS
RESPIRATION RATE: 16 BRPM | TEMPERATURE: 97.9 F | HEART RATE: 61 BPM | BODY MASS INDEX: 35.33 KG/M2 | WEIGHT: 192 LBS | SYSTOLIC BLOOD PRESSURE: 150 MMHG | HEIGHT: 62 IN | DIASTOLIC BLOOD PRESSURE: 62 MMHG | OXYGEN SATURATION: 95 %

## 2024-11-18 DIAGNOSIS — J96.01 ACUTE RESPIRATORY FAILURE WITH HYPOXIA AND HYPERCARBIA: Primary | ICD-10-CM

## 2024-11-18 DIAGNOSIS — I50.33 ACUTE ON CHRONIC HEART FAILURE WITH PRESERVED EJECTION FRACTION (HCC): ICD-10-CM

## 2024-11-18 DIAGNOSIS — I10 ESSENTIAL HYPERTENSION: ICD-10-CM

## 2024-11-18 DIAGNOSIS — J44.1 COPD EXACERBATION (HCC): ICD-10-CM

## 2024-11-18 DIAGNOSIS — E66.812 CLASS 2 OBESITY DUE TO EXCESS CALORIES WITHOUT SERIOUS COMORBIDITY WITH BODY MASS INDEX (BMI) OF 36.0 TO 36.9 IN ADULT: ICD-10-CM

## 2024-11-18 DIAGNOSIS — Z23 ENCOUNTER FOR IMMUNIZATION: ICD-10-CM

## 2024-11-18 DIAGNOSIS — J96.02 ACUTE RESPIRATORY FAILURE WITH HYPOXIA AND HYPERCARBIA: Primary | ICD-10-CM

## 2024-11-18 DIAGNOSIS — Z00.00 MEDICARE ANNUAL WELLNESS VISIT, SUBSEQUENT: ICD-10-CM

## 2024-11-18 DIAGNOSIS — G47.33 OBSTRUCTIVE SLEEP APNEA: ICD-10-CM

## 2024-11-18 DIAGNOSIS — E66.09 CLASS 2 OBESITY DUE TO EXCESS CALORIES WITHOUT SERIOUS COMORBIDITY WITH BODY MASS INDEX (BMI) OF 36.0 TO 36.9 IN ADULT: ICD-10-CM

## 2024-11-18 DIAGNOSIS — E87.1 HYPONATREMIA: ICD-10-CM

## 2024-11-18 DIAGNOSIS — E78.5 HYPERLIPIDEMIA, UNSPECIFIED HYPERLIPIDEMIA TYPE: ICD-10-CM

## 2024-11-18 DIAGNOSIS — E03.9 ACQUIRED HYPOTHYROIDISM: ICD-10-CM

## 2024-11-18 LAB
ALBUMIN SERPL-MCNC: 3.9 G/DL (ref 3.5–5)
ALBUMIN/GLOB SERPL: 1.5 (ref 1.1–2.2)
ALP SERPL-CCNC: 79 U/L (ref 45–117)
ALT SERPL-CCNC: 53 U/L (ref 12–78)
ANION GAP SERPL CALC-SCNC: 5 MMOL/L (ref 2–12)
AST SERPL-CCNC: 23 U/L (ref 15–37)
BASOPHILS # BLD: 0.1 K/UL (ref 0–0.1)
BASOPHILS NFR BLD: 0 % (ref 0–1)
BILIRUB SERPL-MCNC: 0.7 MG/DL (ref 0.2–1)
BUN SERPL-MCNC: 11 MG/DL (ref 6–20)
BUN/CREAT SERPL: 14 (ref 12–20)
CALCIUM SERPL-MCNC: 9.8 MG/DL (ref 8.5–10.1)
CHLORIDE SERPL-SCNC: 97 MMOL/L (ref 97–108)
CO2 SERPL-SCNC: 37 MMOL/L (ref 21–32)
CREAT SERPL-MCNC: 0.81 MG/DL (ref 0.55–1.02)
DIFFERENTIAL METHOD BLD: ABNORMAL
EOSINOPHIL # BLD: 0.2 K/UL (ref 0–0.4)
EOSINOPHIL NFR BLD: 1 % (ref 0–7)
ERYTHROCYTE [DISTWIDTH] IN BLOOD BY AUTOMATED COUNT: 13.5 % (ref 11.5–14.5)
GLOBULIN SER CALC-MCNC: 2.6 G/DL (ref 2–4)
GLUCOSE SERPL-MCNC: 109 MG/DL (ref 65–100)
HCT VFR BLD AUTO: 45.9 % (ref 35–47)
HGB BLD-MCNC: 14 G/DL (ref 11.5–16)
IMM GRANULOCYTES # BLD AUTO: 0.1 K/UL (ref 0–0.04)
IMM GRANULOCYTES NFR BLD AUTO: 1 % (ref 0–0.5)
LYMPHOCYTES # BLD: 2.6 K/UL (ref 0.8–3.5)
LYMPHOCYTES NFR BLD: 14 % (ref 12–49)
MCH RBC QN AUTO: 29.3 PG (ref 26–34)
MCHC RBC AUTO-ENTMCNC: 30.5 G/DL (ref 30–36.5)
MCV RBC AUTO: 96 FL (ref 80–99)
MONOCYTES # BLD: 0.8 K/UL (ref 0–1)
MONOCYTES NFR BLD: 5 % (ref 5–13)
NEUTS SEG # BLD: 14.4 K/UL (ref 1.8–8)
NEUTS SEG NFR BLD: 79 % (ref 32–75)
NRBC # BLD: 0 K/UL (ref 0–0.01)
NRBC BLD-RTO: 0 PER 100 WBC
PLATELET # BLD AUTO: 269 K/UL (ref 150–400)
PMV BLD AUTO: 11.3 FL (ref 8.9–12.9)
POTASSIUM SERPL-SCNC: 4.9 MMOL/L (ref 3.5–5.1)
PROT SERPL-MCNC: 6.5 G/DL (ref 6.4–8.2)
RBC # BLD AUTO: 4.78 M/UL (ref 3.8–5.2)
SODIUM SERPL-SCNC: 139 MMOL/L (ref 136–145)
WBC # BLD AUTO: 18.2 K/UL (ref 3.6–11)

## 2024-11-18 ASSESSMENT — PATIENT HEALTH QUESTIONNAIRE - PHQ9
1. LITTLE INTEREST OR PLEASURE IN DOING THINGS: NOT AT ALL
SUM OF ALL RESPONSES TO PHQ9 QUESTIONS 1 & 2: 0
SUM OF ALL RESPONSES TO PHQ QUESTIONS 1-9: 0
2. FEELING DOWN, DEPRESSED OR HOPELESS: NOT AT ALL

## 2024-11-18 NOTE — PROGRESS NOTES
[place] [address] [place] [address]  Phone: [phone]  Fax: [phone]        Chief Complaint   Patient presents with    Follow-Up from Hospital    Medicare AWV     She is a 74 y.o. female who presents for hospital follow up.    Admitted to [facility] from 11/5 - 11/9 for respiratory failure. Hospital records personally reviewed and summarized as below. She initially presented with nausea and shortness of breath.  Found to be in respiratory failure.  Thought to be multifactorial.  Has diastolic dysfunction, COPD, NORA.  She was treated with abx.  Had LBBB on EKG--underwent nuclear stress testing that was normal.  No obvious infectious source.  Cardiology followed and placed her on lasix.  She was continued ton trelegy and albuterol inhalers.  Completed a course of abx and prednisone taper.  She was discharged on 4L O2.    Also found to be hyponatremic.  Thought that this related to her confusion on presentation. This self-corrected with correction of her respiratory failure.    Overall she is feeling better.  She is only wearing O2 at night at this point.  She is keeping an eye on her SpO2 at home.  There is no dyspnea.  Finished out abx.  Still has a few days of steroid left.    She has followed with pulm discharge--saw a NP at [facility].  They have set her up for a sleep study.    Has f/u with cardiology scheduled on 12/13.    Prior to Visit Medications    Medication Sig Taking? Authorizing Provider   atorvastatin (LIPITOR) 20 MG tablet TAKE ONE TABLET BY MOUTH ONE TIME DAILY Yes Chepe Larose MD   furosemide (LASIX) 20 MG tablet Take 1 tablet by mouth daily Yes Van Melgar MD   predniSONE (DELTASONE) 10 MG tablet Take 4 tablets by mouth daily for 3 days, THEN 3 tablets daily for 3 days, THEN 2 tablets daily for 3 days, THEN 1 tablet daily for 3 days, THEN 0.5 tablets daily for 3 days. Yes Van Melgar MD   fluticasone-umeclidin-vilant (TRELEGY ELLIPTA) 100-62.5-25

## 2024-11-18 NOTE — PROGRESS NOTES
Identified pt with two pt identifiers(name and )    Chief Complaint   Patient presents with    Follow-Up from Hospital Medicare AW        Health Maintenance Due   Topic    Pneumococcal 65+ years Vaccine (1 of 2 - PCV)    DTaP/Tdap/Td vaccine (1 - Tdap)    Breast cancer screen     Shingles vaccine (1 of 2)    Respiratory Syncytial Virus (RSV) Pregnant or age 60 yrs+ (1 - Risk 60-74 years 1-dose series)    Annual Wellness Visit (Medicare Advantage)     Flu vaccine (1)    COVID-19 Vaccine ( -  season)       Wt Readings from Last 3 Encounters:   24 87.1 kg (192 lb)   24 90.8 kg (200 lb 2.8 oz)   24 85.7 kg (189 lb)     Temp Readings from Last 3 Encounters:   24 97.9 °F (36.6 °C) (Temporal)   24 98.2 °F (36.8 °C) (Oral)   24 97.7 °F (36.5 °C) (Temporal)     BP Readings from Last 3 Encounters:   24 (!) 150/64   24 (!) 153/58   24 138/70     Pulse Readings from Last 3 Encounters:   24 61   24 64   24 67           Depression Screening:  :         2024     8:53 AM 2024     1:25 PM 2024     1:11 PM   PHQ-9 Questionaire   Little interest or pleasure in doing things 0 0 0   Feeling down, depressed, or hopeless 0 0 0   PHQ-9 Total Score 0 0 0        Fall Risk Assessment:  :         2024     8:53 AM 2024     1:10 PM   Fall Risk   Do you feel unsteady or are you worried about falling?  no yes   2 or more falls in past year? no no   Fall with injury in past year? no no        Abuse Screening:  :          No data to display                 Coordination of Care Questionnaire:  :     \"Have you been to the ER, urgent care clinic since your last visit?  Hospitalized since your last visit?\"    YES - When: approximately 2  weeks ago.  Where and Why: URI .    “Have you seen or consulted any other health care providers outside our system since your last visit?”    NO    Have you had a mammogram?”   NO    No breast cancer

## 2024-11-21 NOTE — PROGRESS NOTES
Physician Progress Note      PATIENT:               KIMBERLY ADAMS  [facility] #:                  [id number]  :                       1950  ADMIT DATE:       2024 12:58 PM  DISCH DATE:        2024 12:23 PM  RESPONDING  PROVIDER #:        KATIE KUNZ          QUERY TEXT:    Pt admitted with respiratory failure and has CHF documented. If possible,   please document in progress notes and discharge summary further specificity   regarding the type and acuity of CHF:    The medical record reflects the following:  Risk Factors: 74 y.o. female with PMH of HTN, HLD, hypothyroidism,   osteoporosis  who presents to the ER with about one week of SOB and nausea.   Has also had increasing leg swelling over 2 months.  SPO2 64 % in ED  Clinical Indicators:  Med - HTN    Card - Dyspnea-multifactorial-component of CHF exacerbation, history of   NORA/emphysema NT-proBNP 494 on IV diuretics.  ED Provider - She has new bilateral lower extremity edema and was noted to be   hypertensive to the 200s systolic upon arrival.  Treatment: Lasix IV 20mg BID    Thank you,  Glenis Gr RN, CDI  [email or web address]  >  Options provided:  -- Acute on Chronic Diastolic CHF/HFpEF  -- Chronic Diastolic CHF/HFpEF  -- Other - I will add my own diagnosis  -- Disagree - Not applicable / Not valid  -- Disagree - Clinically unable to determine / Unknown  -- Refer to Clinical Documentation Reviewer    PROVIDER RESPONSE TEXT:    This patient is in acute on chronic diastolic CHF/HFpEF.    Query created by: Glenis Gr on 2024 9:52 AM      Electronically signed by:  KATIE KUNZ 2024 3:02 PM

## 2024-12-02 ENCOUNTER — TELEPHONE (OUTPATIENT)
Age: 74
End: 2024-12-02

## 2024-12-02 RX ORDER — FLUTICASONE FUROATE, UMECLIDINIUM BROMIDE AND VILANTEROL TRIFENATATE 100; 62.5; 25 UG/1; UG/1; UG/1
1 POWDER RESPIRATORY (INHALATION) DAILY
Qty: 3 EACH | Refills: 1 | Status: SHIPPED | OUTPATIENT
Start: 2024-12-02

## 2024-12-02 RX ORDER — FUROSEMIDE 20 MG/1
20 TABLET ORAL DAILY
Qty: 90 TABLET | Refills: 1 | Status: SHIPPED | OUTPATIENT
Start: 2024-12-02

## 2024-12-02 NOTE — TELEPHONE ENCOUNTER
furosemide (LASIX) 20 MG tablet        fluticasone-umeclidin-vilant (TRELEGY ELLIPTA) 100-62.5-25 MCG/ACT AEPB inhaler           Pharmacy  [id number] - [place], [place] - [address] - P [phone] - F [phone]  [address] [facility] [address]  Phone: [phone]  Fax: [phone]

## 2024-12-02 NOTE — TELEPHONE ENCOUNTER
furosemide (LASIX) 20 MG tablet      fluticasone-umeclidin-vilant (TRELEGY ELLIPTA) 100-62.5-25 MCG/ACT AEPB inhaler        Pharmacy  [id number] - [place], [place] - [address] [facility] - P [phone] - F [phone]  [address] [facility] [address]  Phone: [phone]  Fax: [phone]   Duration Of Freeze Thaw-Cycle (Seconds): 0 Show Aperture Variable?: Yes Post-Care Instructions: I reviewed with the patient in detail post-care instructions. Patient is to wear sunprotection, and avoid picking at any of the treated lesions. Pt may apply Vaseline to crusted or scabbing areas. Render Note In Bullet Format When Appropriate: No Detail Level: Detailed Consent: The patient's consent was obtained including but not limited to risks of crusting, scabbing, blistering, scarring, darker or lighter pigmentary change, recurrence, incomplete removal and infection.

## 2024-12-13 ENCOUNTER — OFFICE VISIT (OUTPATIENT)
Age: 74
End: 2024-12-13
Payer: MEDICARE

## 2024-12-13 VITALS
OXYGEN SATURATION: 94 % | WEIGHT: 199 LBS | DIASTOLIC BLOOD PRESSURE: 70 MMHG | BODY MASS INDEX: 36.62 KG/M2 | HEART RATE: 77 BPM | HEIGHT: 62 IN | SYSTOLIC BLOOD PRESSURE: 150 MMHG

## 2024-12-13 DIAGNOSIS — I10 ESSENTIAL HYPERTENSION: Primary | ICD-10-CM

## 2024-12-13 DIAGNOSIS — I44.7 LBBB (LEFT BUNDLE BRANCH BLOCK): ICD-10-CM

## 2024-12-13 DIAGNOSIS — I10 ESSENTIAL HYPERTENSION: ICD-10-CM

## 2024-12-13 DIAGNOSIS — I50.32 CHRONIC DIASTOLIC HEART FAILURE (HCC): ICD-10-CM

## 2024-12-13 PROCEDURE — 1123F ACP DISCUSS/DSCN MKR DOCD: CPT | Performed by: INTERNAL MEDICINE

## 2024-12-13 PROCEDURE — 3077F SYST BP >= 140 MM HG: CPT | Performed by: INTERNAL MEDICINE

## 2024-12-13 PROCEDURE — 99214 OFFICE O/P EST MOD 30 MIN: CPT | Performed by: INTERNAL MEDICINE

## 2024-12-13 PROCEDURE — 3078F DIAST BP <80 MM HG: CPT | Performed by: INTERNAL MEDICINE

## 2024-12-13 RX ORDER — METOPROLOL SUCCINATE 100 MG/1
100 TABLET, EXTENDED RELEASE ORAL DAILY
Qty: 90 TABLET | Refills: 1 | Status: SHIPPED | OUTPATIENT
Start: 2024-12-13

## 2024-12-13 NOTE — TELEPHONE ENCOUNTER
metoprolol succinate (TOPROL XL) 100 MG extended release tablet     [id number] - [place], [place] - [address] [place] [address] -  [phone] - F [phone]

## 2024-12-13 NOTE — PROGRESS NOTES
Porfirio Logan MD., [facility]    [address],[facility],[place] [address]    Office [phone],Fax [phone]           Brittney Alston is here for a f/u office visit.    Primary care physician:  Chepe Larose MD    CC - as documented in EMR    Dear Chepe Pfeiffer MD    I had the pleasure of seeing Ms..Suzanne Alston in the office today.      Assessment:     [facility] admission 11/5/2024-11/9/[id number]-xlshmpk-ycujqbnnggbcdq-component of HFpEF exacerbation, history of NORA/emphysema NT-proBNP 494      LBBB-     Hypertension-     Hypothyroidism     Morbid obesity      Plan:      Home blood pressures controlled-systolic blood pressure in the 130s.  Continue medications.  On statin.  Continue to take Lasix 20 mg daily.  Advised to check BMP.  Has appointment with PCP.  Aggressive cardiovascular risk factor modification.  Follow-up 6 mths/earlier as needed.    Patient understands the plan. All questions were answered to the patient's satisfaction.    I appreciate the opportunity to be involved in . See note below for details. Please do not hesitate to contact us with questions or concerns.    Porfirio Logan MD      Cardiac Testing/ Procedures:       A.Cardiac Cath/PCI:    B.ECHO/TYLER: 11/6/2024-echocardiogram-  Left Ventricle: Normal left ventricular systolic function with a visually estimated EF of 55 - 60%. Left ventricle size is normal. Normal wall thickness. Normal wall motion. Abnormal diastolic function.     C.StressNuclear/Stress ECHO/Stress test: 11/8/24-normal Kim nuclear stress test    D.Vascular:    E. EP:    F. Miscellaneous:    History:     Brittney Alston is a 74 y.o. female who returns for follow up visit.    Continues to have mild swelling lower extremities.  Mild chronic dyspnea on exertion.  She did go to a sleep clinic and get a sleep study done.  She was told that she had mild NORA but she did not want to go with the CPAP.  No CP/swelling

## 2024-12-13 NOTE — PROGRESS NOTES
Chief Complaint   Patient presents with    Follow-Up from Hospital    Hypertension    Congestive Heart Failure     Vitals:    12/13/24 1420 12/13/24 1426   BP: (!) 150/70 (!) 150/70   Site: Left Upper Arm    Position: Sitting    Cuff Size: Medium Adult    Pulse: 77    SpO2: 94%    Weight: 90.3 kg (199 lb)    Height: 1.575 m (5' 2\")       BP (!) 150/70   Pulse 77   Ht 1.575 m (5' 2\")   Wt 90.3 kg (199 lb)   SpO2 94%   BMI 36.40 kg/m²

## 2025-01-05 DIAGNOSIS — E03.9 ACQUIRED HYPOTHYROIDISM: ICD-10-CM

## 2025-01-05 DIAGNOSIS — I10 ESSENTIAL HYPERTENSION: ICD-10-CM

## 2025-01-06 RX ORDER — AMLODIPINE BESYLATE 10 MG/1
10 TABLET ORAL DAILY
Qty: 90 TABLET | Refills: 1 | Status: SHIPPED | OUTPATIENT
Start: 2025-01-06

## 2025-01-06 RX ORDER — LEVOTHYROXINE SODIUM 50 UG/1
50 TABLET ORAL DAILY
Qty: 90 TABLET | Refills: 0 | Status: SHIPPED | OUTPATIENT
Start: 2025-01-06

## 2025-01-10 SDOH — ECONOMIC STABILITY: FOOD INSECURITY: WITHIN THE PAST 12 MONTHS, YOU WORRIED THAT YOUR FOOD WOULD RUN OUT BEFORE YOU GOT MONEY TO BUY MORE.: NEVER TRUE

## 2025-01-10 SDOH — ECONOMIC STABILITY: INCOME INSECURITY: IN THE LAST 12 MONTHS, WAS THERE A TIME WHEN YOU WERE NOT ABLE TO PAY THE MORTGAGE OR RENT ON TIME?: NO

## 2025-01-10 SDOH — ECONOMIC STABILITY: FOOD INSECURITY: WITHIN THE PAST 12 MONTHS, THE FOOD YOU BOUGHT JUST DIDN'T LAST AND YOU DIDN'T HAVE MONEY TO GET MORE.: NEVER TRUE

## 2025-01-10 SDOH — ECONOMIC STABILITY: TRANSPORTATION INSECURITY
IN THE PAST 12 MONTHS, HAS THE LACK OF TRANSPORTATION KEPT YOU FROM MEDICAL APPOINTMENTS OR FROM GETTING MEDICATIONS?: NO

## 2025-01-10 SDOH — ECONOMIC STABILITY: TRANSPORTATION INSECURITY
IN THE PAST 12 MONTHS, HAS LACK OF TRANSPORTATION KEPT YOU FROM MEETINGS, WORK, OR FROM GETTING THINGS NEEDED FOR DAILY LIVING?: NO

## 2025-01-13 ENCOUNTER — OFFICE VISIT (OUTPATIENT)
Age: 75
End: 2025-01-13
Payer: MEDICARE

## 2025-01-13 VITALS
HEIGHT: 62 IN | WEIGHT: 203 LBS | HEART RATE: 72 BPM | TEMPERATURE: 98.1 F | BODY MASS INDEX: 37.36 KG/M2 | DIASTOLIC BLOOD PRESSURE: 60 MMHG | RESPIRATION RATE: 16 BRPM | SYSTOLIC BLOOD PRESSURE: 134 MMHG | OXYGEN SATURATION: 95 %

## 2025-01-13 DIAGNOSIS — J44.9 CHRONIC OBSTRUCTIVE PULMONARY DISEASE, UNSPECIFIED COPD TYPE (HCC): ICD-10-CM

## 2025-01-13 DIAGNOSIS — E03.9 ACQUIRED HYPOTHYROIDISM: ICD-10-CM

## 2025-01-13 DIAGNOSIS — H91.93 DECREASED HEARING OF BOTH EARS: ICD-10-CM

## 2025-01-13 DIAGNOSIS — E66.812 CLASS 2 OBESITY DUE TO EXCESS CALORIES WITHOUT SERIOUS COMORBIDITY WITH BODY MASS INDEX (BMI) OF 37.0 TO 37.9 IN ADULT: ICD-10-CM

## 2025-01-13 DIAGNOSIS — Z53.20 MAMMOGRAM DECLINED: ICD-10-CM

## 2025-01-13 DIAGNOSIS — I10 ESSENTIAL HYPERTENSION: Primary | ICD-10-CM

## 2025-01-13 DIAGNOSIS — I50.32 CHRONIC HEART FAILURE WITH PRESERVED EJECTION FRACTION (HCC): ICD-10-CM

## 2025-01-13 DIAGNOSIS — E66.09 CLASS 2 OBESITY DUE TO EXCESS CALORIES WITHOUT SERIOUS COMORBIDITY WITH BODY MASS INDEX (BMI) OF 37.0 TO 37.9 IN ADULT: ICD-10-CM

## 2025-01-13 DIAGNOSIS — Z00.00 MEDICARE ANNUAL WELLNESS VISIT, SUBSEQUENT: ICD-10-CM

## 2025-01-13 PROCEDURE — 99214 OFFICE O/P EST MOD 30 MIN: CPT | Performed by: FAMILY MEDICINE

## 2025-01-13 RX ORDER — SPIRONOLACTONE 25 MG/1
25 TABLET ORAL DAILY
Qty: 30 TABLET | Refills: 2 | Status: SHIPPED | OUTPATIENT
Start: 2025-01-13

## 2025-01-13 ASSESSMENT — PATIENT HEALTH QUESTIONNAIRE - PHQ9
1. LITTLE INTEREST OR PLEASURE IN DOING THINGS: NOT AT ALL
2. FEELING DOWN, DEPRESSED OR HOPELESS: NOT AT ALL
SUM OF ALL RESPONSES TO PHQ9 QUESTIONS 1 & 2: 0
SUM OF ALL RESPONSES TO PHQ QUESTIONS 1-9: 0

## 2025-01-13 ASSESSMENT — LIFESTYLE VARIABLES
HOW OFTEN DO YOU HAVE A DRINK CONTAINING ALCOHOL: NEVER
HOW MANY STANDARD DRINKS CONTAINING ALCOHOL DO YOU HAVE ON A TYPICAL DAY: PATIENT DOES NOT DRINK

## 2025-01-13 NOTE — PROGRESS NOTES
Identified pt with two pt identifiers(name and )    Chief Complaint   Patient presents with    Medicare AWV    Follow-up    Hypertension        Health Maintenance Due   Topic    DTaP/Tdap/Td vaccine (1 - Tdap)    Breast cancer screen     Shingles vaccine (1 of 2)    Respiratory Syncytial Virus (RSV) Pregnant or age 60 yrs+ (1 - Risk 60-74 years 1-dose series)    COVID-19 Vaccine ( -  season)    Annual Wellness Visit (Medicare Advantage)        Wt Readings from Last 3 Encounters:   25 92.1 kg (203 lb)   24 90.3 kg (199 lb)   24 87.1 kg (192 lb)     Temp Readings from Last 3 Encounters:   25 98.1 °F (36.7 °C) (Temporal)   24 97.9 °F (36.6 °C) (Temporal)   24 98.2 °F (36.8 °C) (Oral)     BP Readings from Last 3 Encounters:   25 (!) 156/72   24 (!) 150/70   24 (!) 150/62     Pulse Readings from Last 3 Encounters:   25 72   24 77   24 61           Depression Screening:  :         2025     1:11 PM 2024     8:53 AM 2024     1:25 PM 2024     1:11 PM   PHQ-9 Questionaire   Little interest or pleasure in doing things 0 0 0 0   Feeling down, depressed, or hopeless 0 0 0 0   PHQ-9 Total Score 0 0 0 0        Fall Risk Assessment:  :         2025     1:11 PM 2024     8:53 AM 2024     1:10 PM   Fall Risk   Do you feel unsteady or are you worried about falling?  no no yes   2 or more falls in past year? no no no   Fall with injury in past year? no no no        Abuse Screening:  :          No data to display                 Coordination of Care Questionnaire:  :     \"Have you been to the ER, urgent care clinic since your last visit?  Hospitalized since your last visit?\"    NO    “Have you seen or consulted any other health care providers outside our system since your last visit?”    NO    Have you had a mammogram?”   NO pt states she does not do mammograms     No breast cancer screening on file         Click Here

## 2025-01-13 NOTE — PROGRESS NOTES
[facility]  [address] [place] [address]  Phone: [phone]  Fax: [phone]        Chief Complaint   Patient presents with    Medicare AWV    Follow-up    Hypertension     She is a 75 y.o. female who presents for follow up visit.    Presents for HTN follow up.  Taking medications as prescribed and reports no side effects.  Denies chest pain, headache, visual disturbances.  Does not check BP at home.  Says BP has been difficult to control for her entire life.    Reports good compliance with thyroid medications. No issues/side effects.  Denies signs/symptoms of hyper/hypothyroidism.    She continues to follow with cardiology for CHF.    Prior to Visit Medications    Medication Sig Taking? Authorizing Provider   spironolactone (ALDACTONE) 25 MG tablet Take 1 tablet by mouth daily Yes Chepe Larose MD   amLODIPine (NORVASC) 10 MG tablet TAKE ONE TABLET BY MOUTH ONE TIME DAILY Yes Chepe Larose MD   levothyroxine (SYNTHROID) 50 MCG tablet TAKE ONE TABLET BY MOUTH ONE TIME DAILY Yes Chepe Larose MD   metoprolol succinate (TOPROL XL) 100 MG extended release tablet Take 1 tablet by mouth daily Yes Chepe Larose MD   furosemide (LASIX) 20 MG tablet Take 1 tablet by mouth daily Yes Chepe Larose MD   fluticasone-umeclidin-vilant (TRELEGY ELLIPTA) 100-62.5-25 MCG/ACT AEPB inhaler Inhale 1 puff into the lungs daily Yes Chepe Larose MD   atorvastatin (LIPITOR) 20 MG tablet TAKE ONE TABLET BY MOUTH ONE TIME DAILY Yes Chepe Larose MD   albuterol sulfate HFA (VENTOLIN HFA) 108 (90 Base) MCG/ACT inhaler Inhale 2 puffs into the lungs every 4 hours as needed for Wheezing Yes Van Melgar MD   lisinopril (PRINIVIL;ZESTRIL) 40 MG tablet TAKE ONE TABLET BY MOUTH ONE TIME DAILY Yes Chepe Larose MD   alendronate (FOSAMAX) 70 MG tablet Take 1 tablet by mouth every 7 days Yes Chepe Larose MD   acetaminophen (TYLENOL) 500 MG tablet Take 2 tablets by mouth Yes ProviderSixto MD

## 2025-01-13 NOTE — PATIENT INSTRUCTIONS
rice, citrus fruits, and apples.     Eat lean proteins. Heart-healthy proteins include seafood, lean meats and poultry, eggs, beans, peas, nuts, seeds, and soy products.     Limit drinks and foods with added sugar. These include candy, desserts, and soda pop.   Heart-healthy lifestyle    If your doctor recommends it, get more exercise. For many people, walking is a good choice. Or you may want to swim, bike, or do other activities. Bit by bit, increase the time you're active every day. Try for at least 30 minutes on most days of the week.     Try to quit or cut back on using tobacco and other nicotine products. This includes smoking and vaping. If you need help quitting, talk to your doctor about stop-smoking programs and medicines. These can increase your chances of quitting for good. Quitting is one of the most important things you can do to protect your heart. It is never too late to quit. Try to avoid secondhand smoke too.     Stay at a weight that's healthy for you. Talk to your doctor if you need help losing weight.     Try to get 7 to 9 hours of sleep each night.     Limit alcohol to 2 drinks a day for men and 1 drink a day for women. Too much alcohol can cause health problems.     Manage other health problems such as diabetes, high blood pressure, and high cholesterol. If you think you may have a problem with alcohol or drug use, talk to your doctor.   Medicines    Take your medicines exactly as prescribed. Call your doctor if you think you are having a problem with your medicine.     If your doctor recommends aspirin, take the amount directed each day. Make sure you take aspirin and not another kind of pain reliever, such as acetaminophen (Tylenol).   When should you call for help?   Call 911 if you have symptoms of a heart attack. These may include:    Chest pain or pressure, or a strange feeling in the chest.     Sweating.     Shortness of breath.     Pain, pressure, or a strange feeling in the back,

## 2025-01-15 DIAGNOSIS — M81.0 AGE-RELATED OSTEOPOROSIS WITHOUT CURRENT PATHOLOGICAL FRACTURE: ICD-10-CM

## 2025-01-16 RX ORDER — ALENDRONATE SODIUM 70 MG/1
TABLET ORAL
Qty: 13 TABLET | Refills: 1 | Status: SHIPPED | OUTPATIENT
Start: 2025-01-16

## 2025-01-30 ENCOUNTER — COMMUNITY OUTREACH (OUTPATIENT)
Age: 75
End: 2025-01-30

## 2025-02-12 ENCOUNTER — TRANSCRIBE ORDERS (OUTPATIENT)
Facility: HOSPITAL | Age: 75
End: 2025-02-12

## 2025-02-12 DIAGNOSIS — M19.012 ARTHROSIS OF LEFT ACROMIOCLAVICULAR JOINT: ICD-10-CM

## 2025-02-12 DIAGNOSIS — M89.9 BONE LESION: ICD-10-CM

## 2025-02-12 DIAGNOSIS — M75.52 BURSITIS OF LEFT SHOULDER: Primary | ICD-10-CM

## 2025-02-13 ENCOUNTER — OFFICE VISIT (OUTPATIENT)
Age: 75
End: 2025-02-13
Payer: MEDICARE

## 2025-02-13 VITALS
OXYGEN SATURATION: 95 % | HEIGHT: 62 IN | HEART RATE: 98 BPM | SYSTOLIC BLOOD PRESSURE: 134 MMHG | BODY MASS INDEX: 36.99 KG/M2 | RESPIRATION RATE: 16 BRPM | TEMPERATURE: 98 F | WEIGHT: 201 LBS | DIASTOLIC BLOOD PRESSURE: 60 MMHG

## 2025-02-13 DIAGNOSIS — I50.32 CHRONIC HEART FAILURE WITH PRESERVED EJECTION FRACTION (HCC): ICD-10-CM

## 2025-02-13 DIAGNOSIS — I10 ESSENTIAL HYPERTENSION: Primary | ICD-10-CM

## 2025-02-13 DIAGNOSIS — E66.09 CLASS 2 OBESITY DUE TO EXCESS CALORIES WITHOUT SERIOUS COMORBIDITY WITH BODY MASS INDEX (BMI) OF 36.0 TO 36.9 IN ADULT: ICD-10-CM

## 2025-02-13 DIAGNOSIS — E66.812 CLASS 2 OBESITY DUE TO EXCESS CALORIES WITHOUT SERIOUS COMORBIDITY WITH BODY MASS INDEX (BMI) OF 36.0 TO 36.9 IN ADULT: ICD-10-CM

## 2025-02-13 PROCEDURE — 3078F DIAST BP <80 MM HG: CPT | Performed by: FAMILY MEDICINE

## 2025-02-13 PROCEDURE — G2211 COMPLEX E/M VISIT ADD ON: HCPCS | Performed by: FAMILY MEDICINE

## 2025-02-13 PROCEDURE — 3075F SYST BP GE 130 - 139MM HG: CPT | Performed by: FAMILY MEDICINE

## 2025-02-13 PROCEDURE — 1123F ACP DISCUSS/DSCN MKR DOCD: CPT | Performed by: FAMILY MEDICINE

## 2025-02-13 PROCEDURE — 99213 OFFICE O/P EST LOW 20 MIN: CPT | Performed by: FAMILY MEDICINE

## 2025-02-13 RX ORDER — SPIRONOLACTONE 25 MG/1
25 TABLET ORAL DAILY
Qty: 90 TABLET | Refills: 1 | Status: SHIPPED | OUTPATIENT
Start: 2025-02-13

## 2025-02-13 ASSESSMENT — PATIENT HEALTH QUESTIONNAIRE - PHQ9
SUM OF ALL RESPONSES TO PHQ QUESTIONS 1-9: 0
2. FEELING DOWN, DEPRESSED OR HOPELESS: NOT AT ALL
SUM OF ALL RESPONSES TO PHQ QUESTIONS 1-9: 0
1. LITTLE INTEREST OR PLEASURE IN DOING THINGS: NOT AT ALL
SUM OF ALL RESPONSES TO PHQ9 QUESTIONS 1 & 2: 0

## 2025-02-13 NOTE — PROGRESS NOTES
[facility]  [address] [place] [address]  Phone: [phone]  Fax: [phone]        Chief Complaint   Patient presents with    Follow-up    Hypertension     She is a 75 y.o. female who presents for BP check.  She was started on spironolactone at her last visit as she was noted to have severe edema on exam in addition to elevated BP.  Reports that she is tolerating this medication well. There are no side effects. Not checking BP at home.    Prior to Visit Medications    Medication Sig Taking? Authorizing Provider   spironolactone (ALDACTONE) 25 MG tablet Take 1 tablet by mouth daily Yes Chepe Larose MD   alendronate (FOSAMAX) 70 MG tablet TAKE ONE TABLET BY MOUTH EVERY WEEK Yes Chepe Larose MD   amLODIPine (NORVASC) 10 MG tablet TAKE ONE TABLET BY MOUTH ONE TIME DAILY Yes Chepe Larose MD   levothyroxine (SYNTHROID) 50 MCG tablet TAKE ONE TABLET BY MOUTH ONE TIME DAILY Yes Chepe Larose MD   metoprolol succinate (TOPROL XL) 100 MG extended release tablet Take 1 tablet by mouth daily Yes Chepe Larose MD   furosemide (LASIX) 20 MG tablet Take 1 tablet by mouth daily Yes Chepe Larose MD   fluticasone-umeclidin-vilant (TRELEGY ELLIPTA) 100-62.5-25 MCG/ACT AEPB inhaler Inhale 1 puff into the lungs daily Yes Chepe Larose MD   atorvastatin (LIPITOR) 20 MG tablet TAKE ONE TABLET BY MOUTH ONE TIME DAILY Yes Chepe Larose MD   albuterol sulfate HFA (VENTOLIN HFA) 108 (90 Base) MCG/ACT inhaler Inhale 2 puffs into the lungs every 4 hours as needed for Wheezing Yes Van Melgar MD   lisinopril (PRINIVIL;ZESTRIL) 40 MG tablet TAKE ONE TABLET BY MOUTH ONE TIME DAILY Yes Chepe Larose MD   acetaminophen (TYLENOL) 500 MG tablet Take 2 tablets by mouth Yes Sixto Zeng MD   vitamin D3 (CHOLECALCIFEROL) 125 MCG (5000 UT) TABS tablet Take 1 tablet by mouth Yes ProviderSixto MD       Allergies   Allergen Reactions    Pholcodine      Other Reaction(s): facial swelling

## 2025-02-13 NOTE — PROGRESS NOTES
Identified pt with two pt identifiers(name and )    Chief Complaint   Patient presents with    Follow-up    Hypertension        Health Maintenance Due   Topic    DTaP/Tdap/Td vaccine (1 - Tdap)    Breast cancer screen     Shingles vaccine (1 of 2)    Respiratory Syncytial Virus (RSV) Pregnant or age 60 yrs+ (1 - Risk 60-74 years 1-dose series)    COVID-19 Vaccine ( -  season)       Wt Readings from Last 3 Encounters:   25 91.2 kg (201 lb)   25 92.1 kg (203 lb)   24 90.3 kg (199 lb)     Temp Readings from Last 3 Encounters:   25 98 °F (36.7 °C) (Temporal)   25 98.1 °F (36.7 °C) (Temporal)   24 97.9 °F (36.6 °C) (Temporal)     BP Readings from Last 3 Encounters:   25 (!) 148/58   25 134/60   24 (!) 150/70     Pulse Readings from Last 3 Encounters:   25 98   25 72   24 77           Depression Screening:  :         2025    10:52 AM 2025     1:11 PM 2024     8:53 AM 2024     1:25 PM 2024     1:11 PM   PHQ-9 Questionaire   Little interest or pleasure in doing things 0 0 0 0 0   Feeling down, depressed, or hopeless 0 0 0 0 0   PHQ-9 Total Score 0 0 0 0 0        Fall Risk Assessment:  :         2025     1:11 PM 2024     8:53 AM 2024     1:10 PM   Fall Risk   Do you feel unsteady or are you worried about falling?  no no yes   2 or more falls in past year? no no no   Fall with injury in past year? no no no        Abuse Screening:  :          No data to display                 Coordination of Care Questionnaire:  :     \"Have you been to the ER, urgent care clinic since your last visit?  Hospitalized since your last visit?\"    NO    “Have you seen or consulted any other health care providers outside our system since your last visit?”    NO    Have you had a mammogram?”   NO    No breast cancer screening on file         Click Here for Release of Records Request

## 2025-02-21 ENCOUNTER — HOSPITAL ENCOUNTER (OUTPATIENT)
Facility: HOSPITAL | Age: 75
Discharge: HOME | End: 2025-02-24
Attending: ORTHOPAEDIC SURGERY
Payer: MEDICARE

## 2025-02-21 DIAGNOSIS — M19.012 ARTHROSIS OF LEFT ACROMIOCLAVICULAR JOINT: ICD-10-CM

## 2025-02-21 DIAGNOSIS — M89.9 BONE LESION: ICD-10-CM

## 2025-02-21 DIAGNOSIS — M75.52 BURSITIS OF LEFT SHOULDER: ICD-10-CM

## 2025-02-21 PROCEDURE — 6360000004 HC RX CONTRAST MEDICATION: Performed by: ORTHOPAEDIC SURGERY

## 2025-02-21 PROCEDURE — A9579 GAD-BASE MR CONTRAST NOS,1ML: HCPCS | Performed by: ORTHOPAEDIC SURGERY

## 2025-02-21 PROCEDURE — 73223 MRI JOINT UPR EXTR W/O&W/DYE: CPT

## 2025-02-21 RX ADMIN — GADOTERIDOL 19 ML: 279.3 INJECTION, SOLUTION INTRAVENOUS at 18:29

## 2025-04-03 DIAGNOSIS — E03.9 ACQUIRED HYPOTHYROIDISM: ICD-10-CM

## 2025-04-04 RX ORDER — LEVOTHYROXINE SODIUM 50 UG/1
50 TABLET ORAL DAILY
Qty: 90 TABLET | Refills: 1 | Status: SHIPPED | OUTPATIENT
Start: 2025-04-04

## 2025-05-07 DIAGNOSIS — E78.5 HYPERLIPIDEMIA, UNSPECIFIED HYPERLIPIDEMIA TYPE: ICD-10-CM

## 2025-05-08 RX ORDER — ATORVASTATIN CALCIUM 20 MG/1
20 TABLET, FILM COATED ORAL DAILY
Qty: 90 TABLET | Refills: 1 | Status: SHIPPED | OUTPATIENT
Start: 2025-05-08

## 2025-05-27 RX ORDER — FUROSEMIDE 20 MG/1
20 TABLET ORAL DAILY
Qty: 90 TABLET | Refills: 1 | Status: SHIPPED | OUTPATIENT
Start: 2025-05-27

## 2025-06-01 DIAGNOSIS — I10 ESSENTIAL HYPERTENSION: ICD-10-CM

## 2025-06-02 RX ORDER — FLUTICASONE FUROATE, UMECLIDINIUM BROMIDE AND VILANTEROL TRIFENATATE 100; 62.5; 25 UG/1; UG/1; UG/1
POWDER RESPIRATORY (INHALATION)
Qty: 2 EACH | Refills: 1 | Status: SHIPPED | OUTPATIENT
Start: 2025-06-02

## 2025-06-02 RX ORDER — LISINOPRIL 40 MG/1
40 TABLET ORAL DAILY
Qty: 90 TABLET | Refills: 1 | Status: SHIPPED | OUTPATIENT
Start: 2025-06-02

## 2025-06-10 DIAGNOSIS — I10 ESSENTIAL HYPERTENSION: ICD-10-CM

## 2025-06-11 RX ORDER — METOPROLOL SUCCINATE 100 MG/1
100 TABLET, EXTENDED RELEASE ORAL DAILY
Qty: 90 TABLET | Refills: 1 | Status: SHIPPED | OUTPATIENT
Start: 2025-06-11

## 2025-06-24 DIAGNOSIS — I10 ESSENTIAL HYPERTENSION: ICD-10-CM

## 2025-06-25 RX ORDER — AMLODIPINE BESYLATE 10 MG/1
10 TABLET ORAL DAILY
Qty: 90 TABLET | Refills: 1 | Status: SHIPPED | OUTPATIENT
Start: 2025-06-25

## 2025-07-02 DIAGNOSIS — M81.0 AGE-RELATED OSTEOPOROSIS WITHOUT CURRENT PATHOLOGICAL FRACTURE: ICD-10-CM

## 2025-07-03 RX ORDER — ALENDRONATE SODIUM 70 MG/1
TABLET ORAL
Qty: 13 TABLET | Refills: 1 | Status: SHIPPED | OUTPATIENT
Start: 2025-07-03

## 2025-07-08 DIAGNOSIS — M81.0 AGE-RELATED OSTEOPOROSIS WITHOUT CURRENT PATHOLOGICAL FRACTURE: ICD-10-CM

## 2025-07-08 RX ORDER — ALENDRONATE SODIUM 70 MG/1
TABLET ORAL
Qty: 13 TABLET | Refills: 1 | Status: SHIPPED | OUTPATIENT
Start: 2025-07-08

## 2025-08-01 RX ORDER — FLUTICASONE FUROATE, UMECLIDINIUM BROMIDE AND VILANTEROL TRIFENATATE 100; 62.5; 25 UG/1; UG/1; UG/1
POWDER RESPIRATORY (INHALATION)
Qty: 60 EACH | Refills: 1 | Status: SHIPPED | OUTPATIENT
Start: 2025-08-01

## 2025-08-06 ENCOUNTER — OFFICE VISIT (OUTPATIENT)
Age: 75
End: 2025-08-06
Payer: MEDICARE

## 2025-08-06 VITALS
HEIGHT: 62 IN | SYSTOLIC BLOOD PRESSURE: 132 MMHG | DIASTOLIC BLOOD PRESSURE: 74 MMHG | BODY MASS INDEX: 38.46 KG/M2 | OXYGEN SATURATION: 95 % | HEART RATE: 71 BPM | WEIGHT: 209 LBS

## 2025-08-06 DIAGNOSIS — I10 ESSENTIAL HYPERTENSION: Primary | ICD-10-CM

## 2025-08-06 DIAGNOSIS — E78.5 DYSLIPIDEMIA: ICD-10-CM

## 2025-08-06 DIAGNOSIS — I50.33 ACUTE ON CHRONIC HEART FAILURE WITH PRESERVED EJECTION FRACTION (HCC): ICD-10-CM

## 2025-08-06 DIAGNOSIS — I44.7 LBBB (LEFT BUNDLE BRANCH BLOCK): ICD-10-CM

## 2025-08-06 PROCEDURE — 3078F DIAST BP <80 MM HG: CPT

## 2025-08-06 PROCEDURE — 1123F ACP DISCUSS/DSCN MKR DOCD: CPT

## 2025-08-06 PROCEDURE — 99214 OFFICE O/P EST MOD 30 MIN: CPT

## 2025-08-06 PROCEDURE — 3075F SYST BP GE 130 - 139MM HG: CPT

## 2025-08-06 PROCEDURE — 1159F MED LIST DOCD IN RCRD: CPT

## 2025-08-06 RX ORDER — HYDRALAZINE HYDROCHLORIDE 50 MG/1
50 TABLET, FILM COATED ORAL 3 TIMES DAILY
Qty: 90 TABLET | Refills: 3 | Status: SHIPPED | OUTPATIENT
Start: 2025-08-06

## 2025-08-08 DIAGNOSIS — E78.5 DYSLIPIDEMIA: ICD-10-CM

## 2025-08-08 DIAGNOSIS — I10 ESSENTIAL HYPERTENSION: ICD-10-CM

## 2025-08-08 LAB
ANION GAP SERPL CALC-SCNC: 10 MMOL/L (ref 2–14)
BUN SERPL-MCNC: 19 MG/DL (ref 8–23)
BUN/CREAT SERPL: 16 (ref 12–20)
CALCIUM SERPL-MCNC: 9.8 MG/DL (ref 8.8–10.2)
CHLORIDE SERPL-SCNC: 99 MMOL/L (ref 98–107)
CHOLEST SERPL-MCNC: 156 MG/DL (ref 0–200)
CO2 SERPL-SCNC: 28 MMOL/L (ref 20–29)
CREAT SERPL-MCNC: 1.21 MG/DL (ref 0.6–1)
GLUCOSE SERPL-MCNC: 103 MG/DL (ref 65–100)
HDLC SERPL-MCNC: 45 MG/DL (ref 40–60)
HDLC SERPL: 3.5 (ref 0–5)
LDLC SERPL CALC-MCNC: 77 MG/DL (ref 0–100)
POTASSIUM SERPL-SCNC: 5.9 MMOL/L (ref 3.5–5.1)
SODIUM SERPL-SCNC: 137 MMOL/L (ref 136–145)
TRIGL SERPL-MCNC: 171 MG/DL (ref 0–150)
VLDLC SERPL CALC-MCNC: 34 MG/DL

## 2025-08-11 ENCOUNTER — RESULTS FOLLOW-UP (OUTPATIENT)
Age: 75
End: 2025-08-11

## 2025-08-11 DIAGNOSIS — E87.5 HYPERKALEMIA: Primary | ICD-10-CM

## 2025-08-15 LAB
BUN SERPL-MCNC: 30 MG/DL (ref 8–27)
BUN/CREAT SERPL: 25 (ref 12–28)
CALCIUM SERPL-MCNC: 9.1 MG/DL (ref 8.7–10.3)
CHLORIDE SERPL-SCNC: 100 MMOL/L (ref 96–106)
CO2 SERPL-SCNC: 22 MMOL/L (ref 20–29)
CREAT SERPL-MCNC: 1.22 MG/DL (ref 0.57–1)
EGFRCR SERPLBLD CKD-EPI 2021: 47 ML/MIN/1.73
GLUCOSE SERPL-MCNC: 143 MG/DL (ref 70–99)
POTASSIUM SERPL-SCNC: 5.2 MMOL/L (ref 3.5–5.2)
REPORT: NORMAL
SODIUM SERPL-SCNC: 135 MMOL/L (ref 134–144)

## 2025-08-25 RX ORDER — FUROSEMIDE 20 MG/1
20 TABLET ORAL DAILY
Qty: 90 TABLET | Refills: 1 | Status: SHIPPED | OUTPATIENT
Start: 2025-08-25